# Patient Record
Sex: FEMALE | Race: WHITE | Employment: OTHER | URBAN - METROPOLITAN AREA
[De-identification: names, ages, dates, MRNs, and addresses within clinical notes are randomized per-mention and may not be internally consistent; named-entity substitution may affect disease eponyms.]

---

## 2017-02-01 ENCOUNTER — TELEPHONE (OUTPATIENT)
Dept: FAMILY MEDICINE | Facility: OTHER | Age: 62
End: 2017-02-01

## 2017-02-01 NOTE — TELEPHONE ENCOUNTER
Reason for call:  Results  Reason for Call:  Request for results:    Name of test or procedure: lab    Date of test of procedure: 12-20-16    Location of the test or procedure: [facility] to leave the result message on voice mail or with a family member? YES    Phone number Patient can be reached at:  Home number on file [phone] (home) [phone]    Additional comments: will need results mailed to home.    Call taken on 2/1/2017 at 10:31 AM by Penelope Gomez

## 2017-12-06 NOTE — PROGRESS NOTES
SUBJECTIVE:   CC: Enedelia Abraham is an 62 year old woman who presents for preventive health visit.     Physical   Annual:     Getting at least 3 servings of Calcium per day::  Yes    Bi-annual eye exam::  Yes    Dental care twice a year::  Yes    Sleep apnea or symptoms of sleep apnea::  None    Diet::  Regular (no restrictions)    Frequency of exercise::  4-5 days/week    Duration of exercise::  30-45 minutes    Taking medications regularly::  Yes    Medication side effects::  None    Additional concerns today::  No      Today's PHQ-2 Score:   PHQ-2 ( 1999 Pfizer) 12/11/2017   Q1: Little interest or pleasure in doing things 0   Q2: Feeling down, depressed or hopeless 0   PHQ-2 Score 0   Q1: Little interest or pleasure in doing things Not at all   Q2: Feeling down, depressed or hopeless Not at all   PHQ-2 Score 0       Abuse: Current or Past(Physical, Sexual or Emotional)- No  Do you feel safe in your environment - Yes    Social History   Substance Use Topics     Smoking status: Former Smoker     Packs/day: 0.00     Years: 20.00     Types: Cigarettes     Quit date: 1/1/1998     Smokeless tobacco: Never Used      Comment:  X 20 years/no smokers in household     Alcohol use Yes      Comment: rare- <1 drink Q 6 months     The patient does not drink >3 drinks per day nor >7 drinks per week.    Reviewed orders with patient.  Reviewed health maintenance and updated orders accordingly - Yes  BP Readings from Last 3 Encounters:   12/11/17 112/60   12/20/16 122/64   01/12/16 120/80    Wt Readings from Last 3 Encounters:   12/11/17 127 lb 9.6 oz (57.9 kg)   12/20/16 135 lb (61.2 kg)   01/12/16 131 lb (59.4 kg)                  Current Outpatient Prescriptions   Medication Sig Dispense Refill     traMADol (ULTRAM) 50 MG tablet Take 1-2 tablets ( mg) by mouth 2 times daily as needed for pain 120 tablet 5     pramipexole (MIRAPEX) 0.125 MG tablet Take 1-2 tablets (0.125-0.25 mg) by mouth At Bedtime 180 tablet 3      Ascorbic Acid (VITAMIN C PO) Take 1,000 mg by mouth       CINNAMON PO        COCONUT OIL PO        Chondroitin Sulfate A (CHONDROITIN SULFATE PO)        Omega-3 Fatty Acids (FISH OIL PO) Take  by mouth. 3 per day       ASPIRIN 81 MG OR TABS ONE DAILY 100 3     FLAX SEED OIL 1000 MG OR CAPS and 700mg omeg 3/one twice daily  0     VITAMIN D 1000 UNIT OR CAPS 1 CAPSULE DAILY 30 0     COQ-10 100 MG OR CAPS one daily  0     MAGNESIUM 250 MG OR TABS +glycinate 4 tabs at h.s.  0     MULTI-DAY VITAMINS OR 1 tablet daily         Patient over age 50, mutual decision to screen reflected in health maintenance.      Pertinent mammograms are reviewed under the imaging tab.  History of abnormal Pap smear:   Last 3 Pap Results:   PAP (no units)   Date Value   2015 NIL   12/10/2012 ASC-US (A)   2009 NIL       Reviewed and updated as needed this visit by clinical staffTobacco  Allergies  Meds  Med Hx  Surg Hx  Fam Hx  Soc Hx        Reviewed and updated as needed this visit by Provider        Past Medical History:   Diagnosis Date     Backache, unspecified     fracture of L5     Excessive or frequent menstruation     Menorrhagia 1997 and 3/1997     Pilonidal cyst without mention of abscess     Surgery to remove     Unspecified multiple gestation, delivered     , Vag      Past Surgical History:   Procedure Laterality Date     C LUMBAR SPINE FUSION,ANTER APPRCH  3/01    posterio of L5, S1     COLONOSCOPY N/A 7/15/2015    Procedure: COLONOSCOPY;  Surgeon: Frederick Flores MD;  Location:  GI     DRAIN PILONIDAL CYST SIMPL      Pilonidal cyst surgery     ESOPHAGOSCOPY, GASTROSCOPY, DUODENOSCOPY (EGD), COMBINED  2013    Procedure: COMBINED ESOPHAGOSCOPY, GASTROSCOPY, DUODENOSCOPY (EGD), BIOPSY SINGLE OR MULTIPLE;  ESOPHAGOSCOPY, GASTROSCOPY, DUODENOSCOPY (EGD) with Multiple Biopsy's;  Surgeon: Frederick Dixon MD;  Location:  GI     HC COLONOSCOPY W BIOPSY  05       "DILATION/CURETTAGE DIAG/THER NON OB      D & C       Review of Systems   Constitutional: Negative for chills.   HENT: Negative for congestion and ear pain.    Eyes: Negative for pain.   Respiratory: Negative for cough.    Cardiovascular: Negative for chest pain.   Gastrointestinal: Negative for abdominal pain, constipation, diarrhea and hematochezia.   Genitourinary: Negative for hematuria.   Neurological: Negative for dizziness.     Bone therapy in October 12th, not had to take the Mirapex.   Tramadol only in the Summer when gardening.   Decreased the tramadol      OBJECTIVE:   /60  Pulse 76  Temp 98.9  F (37.2  C) (Temporal)  Resp 14  Ht 5' 1\" (1.549 m)  Wt 127 lb 9.6 oz (57.9 kg)  LMP 04/01/2006  SpO2 98%  BMI 24.11 kg/m2  Physical Exam   Constitutional: She is oriented to person, place, and time. She appears well-developed and well-nourished. No distress.   HENT:   Right Ear: External ear normal.   Left Ear: External ear normal.   Nose: Nose normal.   Mouth/Throat: Oropharynx is clear and moist. No oropharyngeal exudate.   Eyes: Conjunctivae are normal. Pupils are equal, round, and reactive to light. Right eye exhibits no discharge. Left eye exhibits no discharge.   Neck: Neck supple. No tracheal deviation present. No thyromegaly present.   Cardiovascular: Normal rate, regular rhythm, S1 normal, S2 normal, normal heart sounds and normal pulses.  Exam reveals no S3, no S4 and no friction rub.    No murmur heard.  Pulmonary/Chest: Effort normal and breath sounds normal. No respiratory distress. She has no wheezes. She has no rales.   Abdominal: Soft. Bowel sounds are normal. She exhibits no mass. There is no hepatosplenomegaly. There is no tenderness.   Genitourinary: No breast swelling, tenderness or discharge.   Musculoskeletal: Normal range of motion. She exhibits no edema.   Lymphadenopathy:     She has no cervical adenopathy.   Neurological: She is alert and oriented to person, place, and time. " "She has normal strength and normal reflexes. She exhibits normal muscle tone.   Skin: Skin is warm and dry. No rash noted.   Psychiatric: She has a normal mood and affect. Judgment and thought content normal. Cognition and memory are normal.         ASSESSMENT/PLAN:   1. Encounter for routine adult health examination without abnormal findings      2. Restless leg syndrome  - Stable     3. Need for prophylactic vaccination and inoculation against influenza    - Vaccine Administration, Initial [36816]  - FLU VAC, SPLIT VIRUS IM > 3 YO (QUADRIVALENT) [26554]    4. Screening for hyperlipidemia  - Screening completed today.   - Lipid Profile (Chol, Trig, HDL, LDL calc)    5. Screening for diabetes mellitus  - Pending   - Glucose    COUNSELING:  Reviewed preventive health counseling, as reflected in patient instructions       Regular exercise       Healthy diet/nutrition       Immunizations    Vaccinated for: Influenza           reports that she quit smoking about 19 years ago. Her smoking use included Cigarettes. She smoked 0.00 packs per day for 20.00 years. She has never used smokeless tobacco.    Estimated body mass index is 24.11 kg/(m^2) as calculated from the following:    Height as of this encounter: 5' 1\" (1.549 m).    Weight as of this encounter: 127 lb 9.6 oz (57.9 kg).         Counseling Resources:  ATP IV Guidelines  Pooled Cohorts Equation Calculator  Breast Cancer Risk Calculator  FRAX Risk Assessment  ICSI Preventive Guidelines  Dietary Guidelines for Americans, 2010  USDA's MyPlate  ASA Prophylaxis  Lung CA Screening    BARRERA Luna [facility] BERNA Regalado for HPI/ROS submitted by the patient on 12/11/2017   PHQ-2 Score: 0  LAYLA 7 TOTAL SCORE: 0  If you checked off any problems, how difficult have these problems made it for you to do your work, take care of things at home, or get along with other people?: Not difficult at all  PHQ9 TOTAL SCORE: 1    "

## 2017-12-11 ENCOUNTER — OFFICE VISIT (OUTPATIENT)
Dept: FAMILY MEDICINE | Facility: OTHER | Age: 62
End: 2017-12-11
Payer: COMMERCIAL

## 2017-12-11 VITALS
RESPIRATION RATE: 14 BRPM | BODY MASS INDEX: 24.09 KG/M2 | OXYGEN SATURATION: 98 % | HEIGHT: 61 IN | HEART RATE: 76 BPM | SYSTOLIC BLOOD PRESSURE: 112 MMHG | WEIGHT: 127.6 LBS | TEMPERATURE: 98.9 F | DIASTOLIC BLOOD PRESSURE: 60 MMHG

## 2017-12-11 DIAGNOSIS — Z23 NEED FOR PROPHYLACTIC VACCINATION AND INOCULATION AGAINST INFLUENZA: ICD-10-CM

## 2017-12-11 DIAGNOSIS — Z13.1 SCREENING FOR DIABETES MELLITUS: ICD-10-CM

## 2017-12-11 DIAGNOSIS — Z00.00 ENCOUNTER FOR ROUTINE ADULT HEALTH EXAMINATION WITHOUT ABNORMAL FINDINGS: Primary | ICD-10-CM

## 2017-12-11 DIAGNOSIS — G25.81 RESTLESS LEG SYNDROME: ICD-10-CM

## 2017-12-11 DIAGNOSIS — Z13.220 SCREENING FOR HYPERLIPIDEMIA: ICD-10-CM

## 2017-12-11 LAB
CHOLEST SERPL-MCNC: 209 MG/DL
GLUCOSE SERPL-MCNC: 93 MG/DL (ref 70–99)
HDLC SERPL-MCNC: 87 MG/DL
LDLC SERPL CALC-MCNC: 94 MG/DL
NONHDLC SERPL-MCNC: 122 MG/DL
TRIGL SERPL-MCNC: 140 MG/DL

## 2017-12-11 PROCEDURE — 90686 IIV4 VACC NO PRSV 0.5 ML IM: CPT | Performed by: NURSE PRACTITIONER

## 2017-12-11 PROCEDURE — 90471 IMMUNIZATION ADMIN: CPT | Performed by: NURSE PRACTITIONER

## 2017-12-11 PROCEDURE — 99396 PREV VISIT EST AGE 40-64: CPT | Mod: 25 | Performed by: NURSE PRACTITIONER

## 2017-12-11 PROCEDURE — 36415 COLL VENOUS BLD VENIPUNCTURE: CPT | Performed by: NURSE PRACTITIONER

## 2017-12-11 PROCEDURE — 80061 LIPID PANEL: CPT | Performed by: NURSE PRACTITIONER

## 2017-12-11 PROCEDURE — 82947 ASSAY GLUCOSE BLOOD QUANT: CPT | Performed by: NURSE PRACTITIONER

## 2017-12-11 ASSESSMENT — ANXIETY QUESTIONNAIRES
1. FEELING NERVOUS, ANXIOUS, OR ON EDGE: NOT AT ALL
GAD7 TOTAL SCORE: 0
7. FEELING AFRAID AS IF SOMETHING AWFUL MIGHT HAPPEN: NOT AT ALL
5. BEING SO RESTLESS THAT IT IS HARD TO SIT STILL: NOT AT ALL
GAD7 TOTAL SCORE: 0
6. BECOMING EASILY ANNOYED OR IRRITABLE: NOT AT ALL
2. NOT BEING ABLE TO STOP OR CONTROL WORRYING: NOT AT ALL
GAD7 TOTAL SCORE: 0
3. WORRYING TOO MUCH ABOUT DIFFERENT THINGS: NOT AT ALL
7. FEELING AFRAID AS IF SOMETHING AWFUL MIGHT HAPPEN: NOT AT ALL
4. TROUBLE RELAXING: NOT AT ALL

## 2017-12-11 ASSESSMENT — ENCOUNTER SYMPTOMS
EYE PAIN: 0
ABDOMINAL PAIN: 0
COUGH: 0
HEMATURIA: 0
DIARRHEA: 0
HEMATOCHEZIA: 0
DIZZINESS: 0
CONSTIPATION: 0
CHILLS: 0

## 2017-12-11 ASSESSMENT — PAIN SCALES - GENERAL: PAINLEVEL: NO PAIN (0)

## 2017-12-11 ASSESSMENT — PATIENT HEALTH QUESTIONNAIRE - PHQ9
SUM OF ALL RESPONSES TO PHQ QUESTIONS 1-9: 1
10. IF YOU CHECKED OFF ANY PROBLEMS, HOW DIFFICULT HAVE THESE PROBLEMS MADE IT FOR YOU TO DO YOUR WORK, TAKE CARE OF THINGS AT HOME, OR GET ALONG WITH OTHER PEOPLE: NOT DIFFICULT AT ALL
SUM OF ALL RESPONSES TO PHQ QUESTIONS 1-9: 1

## 2017-12-11 NOTE — PROGRESS NOTES
Injectable Influenza Immunization Documentation    1.  Is the person to be vaccinated sick today?   No    2. Does the person to be vaccinated have an allergy to a component   of the vaccine?   No  Egg Allergy Algorithm Link    3. Has the person to be vaccinated ever had a serious reaction   to influenza vaccine in the past?   No    4. Has the person to be vaccinated ever had Guillain-Barré syndrome?   No    Form completed by Pilar Telles CMA    Prior to injection verified patient identity using patient's name and date of birth.  Patient instructed to remain in clinic for 15 minutes afterwards, and to report any adverse reaction to me immediately.    Pilar Telles CMA

## 2017-12-11 NOTE — MR AVS SNAPSHOT
After Visit Summary   12/11/2017    Enedelia Abraham    MRN: [id number]           Patient Information     Date Of Birth          1955        Visit Information        Provider Department      12/11/2017 11:20 AM Grace Villegas APRN [facility]        Today's Diagnoses     Encounter for routine adult health examination without abnormal findings    -  1    Restless leg syndrome        Need for prophylactic vaccination and inoculation against influenza        Screening for hyperlipidemia        Screening for diabetes mellitus          Care Instructions      Preventive Health Recommendations  Female Ages 50 - 64    Yearly exam: See your health care provider every year in order to  o Review health changes.   o Discuss preventive care.    o Review your medicines if your doctor has prescribed any.      Get a Pap test every three years (unless you have an abnormal result and your provider advises testing more often).    If you get Pap tests with HPV test, you only need to test every 5 years, unless you have an abnormal result.     You do not need a Pap test if your uterus was removed (hysterectomy) and you have not had cancer.    You should be tested each year for STDs (sexually transmitted diseases) if you're at risk.     Have a mammogram every 1 to 2 years.    Have a colonoscopy at age 50, or have a yearly FIT test (stool test). These exams screen for colon cancer.      Have a cholesterol test every 5 years, or more often if advised.    Have a diabetes test (fasting glucose) every three years. If you are at risk for diabetes, you should have this test more often.     If you are at risk for osteoporosis (brittle bone disease), think about having a bone density scan (DEXA).    Shots: Get a flu shot each year. Get a tetanus shot every 10 years.    Nutrition:     Eat at least 5 servings of fruits and vegetables each day.    Eat whole-grain bread, whole-wheat pasta and brown rice instead  of white grains and rice.    Talk to your provider about Calcium and Vitamin D.     Lifestyle    Exercise at least 150 minutes a week (30 minutes a day, 5 days a week). This will help you control your weight and prevent disease.    Limit alcohol to one drink per day.    No smoking.     Wear sunscreen to prevent skin cancer.     See your dentist every six months for an exam and cleaning.    See your eye doctor every 1 to 2 years.            Follow-ups after your visit        Your next 10 appointments already scheduled     Dec 20, 2017 10:45 AM CST   (Arrive by 10:30 AM)   MA SCREENING DIGITAL BILATERAL with [facility]   [facility]    [address] [facility] [id number] [phone]           Do not use any powder, lotion or deodorant under your arms or on your breast. If you do, we will ask you to remove it before your exam.  Wear comfortable, two-piece clothing.  If you have any allergies, tell your care team.  Bring any previous mammograms from other facilities or have them mailed to the breast center.              Who to contact     If you have questions or need follow up information about today's clinic visit or your schedule please contact [facility] directly at [phone].  Normal or non-critical lab and imaging results will be communicated to you by [employer], letter or phone within 4 business days after the clinic has received the results. If you do not hear from us within 7 days, please contact the clinic through [employer] or phone. If you have a critical or abnormal lab result, we will notify you by phone as soon as possible.  Submit refill requests through [employer] or call your pharmacy and they will forward the refill request to us. Please allow 3 business days for your refill to be completed.          Additional Information About Your Visit        [employer] Information     [employer] lets you send messages to your doctor, view your test results,  "renew your prescriptions, schedule appointments and more. To sign up, go to [email or web address] . Click on \"Log in\" on the left side of the screen, which will take you to the Welcome page. Then click on \"Sign up Now\" on the right side of the page.     You will be asked to enter the access code listed below, as well as some personal information. Please follow the directions to create your username and password.     Your access code is: [email or web address]  Expires: 3/11/2018 12:25 PM     Your access code will  in 90 days. If you need help or a new code, please call your [place] clinic or [phone].        Care EveryWhere ID     This is your Care EveryWhere ID. This could be used by other organizations to access your [place] medical records  [id number]        Your Vitals Were     Pulse Temperature Respirations Height Last Period Pulse Oximetry    76 98.9  F (37.2  C) (Temporal) 14 5' 1\" (1.549 m) 2006 98%    BMI (Body Mass Index)                   24.11 kg/m2            Blood Pressure from Last 3 Encounters:   17 112/60   16 122/64   16 120/80    Weight from Last 3 Encounters:   17 127 lb 9.6 oz (57.9 kg)   16 135 lb (61.2 kg)   16 131 lb (59.4 kg)              We Performed the Following     Glucose     Lipid Profile (Chol, Trig, HDL, LDL calc)        Primary Care Provider Office Phone # Fax #    Dayan Bateman [phone]       [address]  [facility] [address]        Equal Access to Services     [facility] REGINALD : Hadii molly Nam, juwan bhat, anita hornalmasanchez arevalo, daniele francois. So [facility] [phone].    ATENCIÓN: Si habla español, tiene a salter disposición servicios gratuitos de asistencia lingüística. Llame al [phone].    We comply with applicable federal civil rights laws and [place] laws. We do not discriminate on the basis of race, color, national origin, age, disability, sex, " sexual orientation, or gender identity.            Thank you!     Thank you for choosing [facility]  for your care. Our goal is always to provide you with excellent care. Hearing back from our patients is one way we can continue to improve our services. Please take a few minutes to complete the written survey that you may receive in the mail after your visit with us. Thank you!             Your Updated Medication List - Protect others around you: Learn how to safely use, store and throw away your medicines at www.disposemymeds.org.          This list is accurate as of: 12/11/17 12:25 PM.  Always use your most recent med list.                   Brand Name Dispense Instructions for use Diagnosis    aspirin 81 MG tablet     100    ONE DAILY        CHONDROITIN SULFATE PO           CINNAMON PO           COCONUT OIL PO           CoQ-10 100 MG Caps      one daily        FISH OIL PO      Take  by mouth. 3 per day        flax seed oil 1000 MG capsule      and 700mg omeg 3/one twice daily        magnesium 250 MG tablet      +glycinate 4 tabs at h.s.        MULTI-DAY VITAMINS PO      1 tablet daily        pramipexole 0.125 MG tablet    MIRAPEX    180 tablet    Take 1-2 tablets (0.125-0.25 mg) by mouth At Bedtime    Restless leg syndrome       traMADol 50 MG tablet    ULTRAM    120 tablet    Take 1-2 tablets ( mg) by mouth 2 times daily as needed for pain    Bilateral low back pain without sciatica, unspecified chronicity, Restless leg syndrome       VITAMIN C PO      Take 1,000 mg by mouth        vitamin D 1000 UNITS capsule     30    1 CAPSULE DAILY

## 2017-12-11 NOTE — NURSING NOTE
"Chief Complaint   Patient presents with     Physical     Panel Management     mychart, height, fl, honoring choices, urine drug screen, phq9/[id number]       Initial /60  Pulse 76  Temp 98.9  F (37.2  C) (Temporal)  Resp 14  Ht 5' 1\" (1.549 m)  Wt 127 lb 9.6 oz (57.9 kg)  LMP 04/01/2006  SpO2 98%  BMI 24.11 kg/m2 Estimated body mass index is 24.11 kg/(m^2) as calculated from the following:    Height as of this encounter: 5' 1\" (1.549 m).    Weight as of this encounter: 127 lb 9.6 oz (57.9 kg).  Medication Reconciliation: complete  "

## 2017-12-12 ENCOUNTER — TELEPHONE (OUTPATIENT)
Dept: FAMILY MEDICINE | Facility: OTHER | Age: 62
End: 2017-12-12

## 2017-12-12 ASSESSMENT — PATIENT HEALTH QUESTIONNAIRE - PHQ9: SUM OF ALL RESPONSES TO PHQ QUESTIONS 1-9: 1

## 2017-12-12 ASSESSMENT — ANXIETY QUESTIONNAIRES: GAD7 TOTAL SCORE: 0

## 2017-12-12 NOTE — TELEPHONE ENCOUNTER
LM for patient regarding message below.  Kiara Cardoso CMA (AAMA)    Notes Recorded by Grace Villegas APRN CNP on 12/12/2017 at 7:57 AM  Please let patient know that her glucose was normal along with her cholesterol looks great. Her Total cholesterol is a little elevated, but her good and bad cholesterol numbers are stable. Continue with healthy lifestyle and recheck in 1 year.     BARRERA Luna CNP    Results for orders placed or performed in visit on 12/11/17   Lipid Profile (Chol, Trig, HDL, LDL calc)   Result Value Ref Range    Cholesterol 209 (H) <200 mg/dL    Triglycerides 140 <150 mg/dL    HDL Cholesterol 87 >49 mg/dL    LDL Cholesterol Calculated 94 <100 mg/dL    Non HDL Cholesterol 122 <130 mg/dL   Glucose   Result Value Ref Range    Glucose 93 70 - 99 mg/dL

## 2017-12-20 ENCOUNTER — RADIANT APPOINTMENT (OUTPATIENT)
Dept: MAMMOGRAPHY | Facility: OTHER | Age: 62
End: 2017-12-20
Payer: COMMERCIAL

## 2017-12-20 DIAGNOSIS — Z12.31 VISIT FOR SCREENING MAMMOGRAM: ICD-10-CM

## 2017-12-20 PROCEDURE — G0202 SCR MAMMO BI INCL CAD: HCPCS | Mod: TC

## 2018-04-18 ENCOUNTER — TRANSFERRED RECORDS (OUTPATIENT)
Dept: HEALTH INFORMATION MANAGEMENT | Facility: CLINIC | Age: 63
End: 2018-04-18

## 2018-07-05 DIAGNOSIS — M54.50 BILATERAL LOW BACK PAIN WITHOUT SCIATICA, UNSPECIFIED CHRONICITY: ICD-10-CM

## 2018-07-05 DIAGNOSIS — G25.81 RESTLESS LEG SYNDROME: ICD-10-CM

## 2018-07-06 RX ORDER — TRAMADOL HYDROCHLORIDE 50 MG/1
50-100 TABLET ORAL 2 TIMES DAILY PRN
Qty: 120 TABLET | Refills: 5 | OUTPATIENT
Start: 2018-07-06

## 2018-07-06 NOTE — TELEPHONE ENCOUNTER
This hasn't been addressed since 12/2016, it is a controlled substance, she will need an appointment.

## 2018-07-06 NOTE — TELEPHONE ENCOUNTER
Requested Prescriptions   Pending Prescriptions Disp Refills     traMADol (ULTRAM) 50 MG tablet 120 tablet 5     Sig: Take 1-2 tablets ( mg) by mouth 2 times daily as needed for pain    There is no refill protocol information for this order        traMADol (ULTRAM) 50 MG tablet      Last Written Prescription Date:  12/20/2016  Last Fill Quantity: 120,   # refills: 5  Last Office Visit: 12/11/2017  Future Office visit:       Routing refill request to provider for review/approval because:  Drug not on the [facility] or [facility] refill protocol or controlled substance  Last prescribed 12/2016    Last saw KV, who is not in clinic today. Routing to PCP    Kim Roca, RN, BSN

## 2018-09-17 NOTE — PROGRESS NOTES
[address]  [facility] [address]  [phone]  Dept: [phone]    PRE-OP EVALUATION:  Today's date: 2018    Enedelia Abraham (: 1955) presents for pre-operative evaluation assessment as requested by Dr. Fajardo.  She requires evaluation and anesthesia risk assessment prior to undergoing surgery/procedure for treatment of Bilateral Cataracts.    Fax number for surgical facility: [phone]  Primary Physician: Dayan Bateman  Type of Anesthesia Anticipated: IV sedative    Patient has a Health Care Directive or Living Will:  YES     Preop Questions 2018   Who is doing your surgery? Dr. Fajardo   What are you having done? Cataract Surgery    Date of Surgery/Procedure: 10-   Facility or Hospital where procedure/surgery will be performed: [facility]   1.  Do you have a history of Heart attack, stroke, stent, coronary bypass surgery, or other heart surgery? No   2.  Do you ever have any pain or discomfort in your chest? No   3.  Do you have a history of  Heart Failure? No   4.   Are you troubled by shortness of breath when:  walking on a level surface, or up a slight hill, or at night? No   5.  Do you currently have a cold, bronchitis or other respiratory infection? No   6.  Do you have a cough, shortness of breath, or wheezing? No   7.  Do you sometimes get pains in the calves of your legs when you walk? No   8. Do you or anyone in your family have previous history of blood clots? No   9.  Do you or does anyone in your family have a serious bleeding problem such as prolonged bleeding following surgeries or cuts? No   10. Have you ever had problems with anemia or been told to take iron pills? No   11. Have you had any abnormal blood loss such as black, tarry or bloody stools, or abnormal vaginal bleeding? No   12. Have you ever had a blood transfusion? No   13. Have you or any of your relatives ever had problems with anesthesia?  No   14. Do you have sleep apnea, excessive snoring or daytime drowsiness? No   15. Do you have any prosthetic heart valves? No   16. Do you have prosthetic joints? No   17. Is there any chance that you may be pregnant? No         HPI:     HPI related to upcoming procedure: She is being seen by Nancy Braxton OD at [facility]. Per her last ophthalmology note, in the last year she has noticed increased difficulty reading, sun blinding her, difficulty with nighttime driving. She often needs to use a magnifying glass to read anything that is up close. She avoids nighttime driving. She will be having surgery on both cataracts, one on October 8th, and one later in the month, not sure of the date.       See problem list for active medical problems.  Problems all longstanding and stable, except as noted/documented.  See ROS for pertinent symptoms related to these conditions.                                                                                                                                                          .  MEDICAL HISTORY:     Patient Active Problem List    Diagnosis Date Noted     Chronic pain syndrome 12/20/2016     Priority: Medium     Patient is followed by Dayan Bateman MD for ongoing prescription of pain medication.  All refills should be approved by this provider, or covering partner.    Medication(s): tramadol.   Maximum quantity per month: 120  Clinic visit frequency required: Q 6  months     Controlled substance agreement:  Encounter-Level CSA:     There are no encounter-level csa.        Pain Clinic evaluation in the past: No    DIRE Total Score(s):  No flowsheet data found.    Last [facility] website verification:  done on 12/20/16   [email or web address]               Back pain 12/11/2013     Priority: Medium     Restless leg syndrome 12/11/2013     Priority: Medium     ASCUS on Pap smear 12/17/2012     Priority: Medium     12/10/12 pap- ASCUS neg HPV. Plan-- per  updated guidelines, repeat screening pap in 3 years.  12/18/15 pap NIL/ neg HR HPV. Plan routine screening.            Advanced directives, counseling/discussion 12/10/2012     Priority: Medium     Patient states has Advance Directive and will bring in a copy to clinic. 12/10/2012   Advance Care Planning:   Receipt of ACP document:  Received: Health Care Directive which was witnessed or notarized on 2013.  Document not previously scanned.  Validation form completed and sent with document to be scanned.  Code Status reflects choices in most recent ACP document.  Confirmed/documented designated decision maker(s). See permanent comments section of demographics in clinical tab. View document(s) and details by clicking on code status.   Added by CATHY BEEBE on 2013.              Past Medical History:   Diagnosis Date     Backache, unspecified     fracture of L5     Excessive or frequent menstruation     Menorrhagia 1997 and 3/1997     Pilonidal cyst without mention of abscess     Surgery to remove     Unspecified multiple gestation, delivered     , Vag     Past Surgical History:   Procedure Laterality Date     C LUMBAR SPINE FUSION,ANTER APPRCH  3/01    posterio of L5, S1     COLONOSCOPY N/A 7/15/2015    Procedure: COLONOSCOPY;  Surgeon: Frederick Flores MD;  Location: [facility]     DRAIN PILONIDAL CYST SIMPL      Pilonidal cyst surgery     ESOPHAGOSCOPY, GASTROSCOPY, DUODENOSCOPY (EGD), COMBINED  2013    Procedure: COMBINED ESOPHAGOSCOPY, GASTROSCOPY, DUODENOSCOPY (EGD), BIOPSY SINGLE OR MULTIPLE;  ESOPHAGOSCOPY, GASTROSCOPY, DUODENOSCOPY (EGD) with Multiple Biopsy's;  Surgeon: Frederick Dixon MD;  Location:  GI     HC COLONOSCOPY W BIOPSY  05     HC DILATION/CURETTAGE DIAG/THER NON OB      D & C     Current Outpatient Prescriptions   Medication Sig Dispense Refill     Ascorbic Acid (VITAMIN C PO) Take 1,000 mg by mouth       ASPIRIN 81 MG OR TABS ONE DAILY 100 3      Chondroitin Sulfate A (CHONDROITIN SULFATE PO)        CINNAMON PO        COCONUT OIL PO        COQ-10 100 MG OR CAPS one daily  0     FLAX SEED OIL 1000 MG OR CAPS and 700mg omeg 3/one twice daily  0     MAGNESIUM 250 MG OR TABS +glycinate 4 tabs at h.s.  0     MULTI-DAY VITAMINS OR 1 tablet daily       Omega-3 Fatty Acids (FISH OIL PO) Take  by mouth. 3 per day       pramipexole (MIRAPEX) 0.125 MG tablet Take 1-2 tablets (0.125-0.25 mg) by mouth At Bedtime 180 tablet 3     traMADol (ULTRAM) 50 MG tablet Take 1-2 tablets ( mg) by mouth 2 times daily as needed for pain 120 tablet 5     VITAMIN D 1000 UNIT OR CAPS 1 CAPSULE DAILY 30 0     OTC products: herbals and vitamins - See medication list, patient advised not to take; Ibuprofen    Allergies   Allergen Reactions     Bupropion Hcl Hives     Wellbutrin     Clindamycin Diarrhea     Several Loose stools/cramping/rectal pressure      Latex Allergy: NO    Social History   Substance Use Topics     Smoking status: Former Smoker     Packs/day: 0.00     Years: 20.00     Types: Cigarettes     Quit date: 1/1/1998     Smokeless tobacco: Never Used      Comment:  X 20 years/no smokers in household     Alcohol use Yes      Comment: rare- <1 drink Q 6 months     History   Drug Use No       REVIEW OF SYSTEMS:   Constitutional, HEENT, cardiovascular, pulmonary, GI, , musculoskeletal, neuro, skin, endocrine and psych systems are negative, except as in HPI or otherwise noted.     This document serves as a record of the services and decisions personally performed and made by Uma Grimm MD. It was created on her behalf by Sarah Dumont, a trained medical scribe. The creation of this document is based the provider's statements to the medical scribe.  Sarah Dumont, September 26, 2018 9:32 AM     EXAM:   LMP 04/01/2006  GENERAL APPEARANCE: healthy, alert and no distress  HENT: ear canals and TM's normal and nose and mouth without ulcers or lesions  RESP: lungs clear to auscultation -  no rales, rhonchi or wheezes  CV: regular rate and rhythm, normal S1 S2, no S3 or S4 and no murmur, click or rub   ABDOMEN: soft, nontender, no HSM or masses and bowel sounds normal  NEURO: Normal strength and tone, sensory exam grossly normal, mentation intact and speech normal    DIAGNOSTICS:     No labs or EKG required for low risk surgery (cataract, skin procedure, breast biopsy, etc)  Labs Resulted Today:   Results for orders placed or performed in visit on 04/18/18   Eye Exam - HIM Scan    Narrative    [facility]       Recent Labs   Lab Test  12/16/14   1033  09/09/10   1132   HGB  13.7  13.5   PLT  220  301   NA  138   --    POTASSIUM  3.8   --    CR  0.72   --         IMPRESSION:   Reason for surgery/procedure: Bilateral cataracts  Diagnosis/reason for consult: Otherwise healthy 62 year old patient presenting for anesthesia risk evaluation.     The proposed surgical procedure is considered LOW risk.    REVISED CARDIAC RISK INDEX  The patient has the following serious cardiovascular risks for perioperative complications such as (MI, PE, VFib and 3  AV Block):  No serious cardiac risks  INTERPRETATION: 0 risks: Class I (very low risk - 0.4% complication rate)    The patient has the following additional risks for perioperative complications:  No identified additional risks      ICD-10-CM    1. Screening for HIV (human immunodeficiency virus) Z11.4    2. Preop general physical exam Z01.818        RECOMMENDATIONS:     --Patient is to take all scheduled medications on the day of surgery EXCEPT for modifications listed below.    Anticoagulant or Antiplatelet Medication Use  ASPIRIN: Bleeding risk is low for this procedure and aspirin 81 mg daily should be continued in the perioperative period        APPROVAL GIVEN to proceed with proposed procedure, without further diagnostic evaluation     The information in this document, created by the medical scribe for me, accurately reflects the services I  personally performed and the decisions made by me. I have reviewed and approved this document for accuracy.   Uma Grimm MD     Signed Electronically by: Uma Grimm MD, MD    Copy of this evaluation report is provided to requesting physician.    [facility] Preop Guidelines    Revised Cardiac Risk Index

## 2018-09-26 ENCOUNTER — OFFICE VISIT (OUTPATIENT)
Dept: FAMILY MEDICINE | Facility: OTHER | Age: 63
End: 2018-09-26
Payer: COMMERCIAL

## 2018-09-26 VITALS
DIASTOLIC BLOOD PRESSURE: 70 MMHG | TEMPERATURE: 98.5 F | BODY MASS INDEX: 24.92 KG/M2 | WEIGHT: 132 LBS | HEART RATE: 70 BPM | HEIGHT: 61 IN | OXYGEN SATURATION: 99 % | SYSTOLIC BLOOD PRESSURE: 132 MMHG

## 2018-09-26 DIAGNOSIS — Z11.4 SCREENING FOR HIV (HUMAN IMMUNODEFICIENCY VIRUS): ICD-10-CM

## 2018-09-26 DIAGNOSIS — Z01.818 PREOP GENERAL PHYSICAL EXAM: Primary | ICD-10-CM

## 2018-09-26 DIAGNOSIS — Z23 NEED FOR PROPHYLACTIC VACCINATION AND INOCULATION AGAINST INFLUENZA: ICD-10-CM

## 2018-09-26 PROCEDURE — 90682 RIV4 VACC RECOMBINANT DNA IM: CPT | Performed by: FAMILY MEDICINE

## 2018-09-26 PROCEDURE — 90471 IMMUNIZATION ADMIN: CPT | Performed by: FAMILY MEDICINE

## 2018-09-26 PROCEDURE — 99214 OFFICE O/P EST MOD 30 MIN: CPT | Performed by: FAMILY MEDICINE

## 2018-09-26 NOTE — PROGRESS NOTES

## 2018-09-26 NOTE — MR AVS SNAPSHOT
After Visit Summary   9/26/2018    Enedelia Abraham    MRN: [id number]           Patient Information     Date Of Birth          1955        Visit Information        Provider Department      9/26/2018 9:00 AM Uma Grimm MD [facility]        Today's Diagnoses     Preop general physical exam    -  1    Screening for HIV (human immunodeficiency virus)          Care Instructions    Stop: Ibuprofen (tylenol okay for pain), and all supplements one week prior to surgery.     Before Your Surgery      Call your surgeon if there is any change in your health. This includes signs of a cold or flu (such as a sore throat, runny nose, cough, rash or fever).    Do not smoke, drink alcohol or take over the counter medicine (unless your surgeon or primary care doctor tells you to) for the 24 hours before and after surgery.    If you take prescribed drugs: Follow your doctor s orders about which medicines to take and which to stop until after surgery.    Eating and drinking prior to surgery: follow the instructions from your surgeon    Take a shower or bath the night before surgery. Use the soap your surgeon gave you to gently clean your skin. If you do not have soap from your surgeon, use your regular soap. Do not shave or scrub the surgery site.  Wear clean pajamas and have clean sheets on your bed.           Follow-ups after your visit        Who to contact     If you have questions or need follow up information about today's clinic visit or your schedule please contact [facility] directly at [phone].  Normal or non-critical lab and imaging results will be communicated to you by MyChart, letter or phone within 4 business days after the clinic has received the results. If you do not hear from us within 7 days, please contact the clinic through MyChart or phone. If you have a critical or abnormal lab result, we will notify you by phone as soon as possible.  Submit refill  "requests through Reissued or call your pharmacy and they will forward the refill request to us. Please allow 3 business days for your refill to be completed.          Additional Information About Your Visit        Reissued Information     Reissued lets you send messages to your doctor, view your test results, renew your prescriptions, schedule appointments and more. To sign up, go to [email or web address] . Click on \"Log in\" on the left side of the screen, which will take you to the Welcome page. Then click on \"Sign up Now\" on the right side of the page.     You will be asked to enter the access code listed below, as well as some personal information. Please follow the directions to create your username and password.     Your access code is: [email or web address]  Expires: 2018  9:00 AM     Your access code will  in 90 days. If you need help or a new code, please call your [place] clinic or [phone].        Care EveryWhere ID     This is your Care EveryWhere ID. This could be used by other organizations to access your [place] medical records  [id number]        Your Vitals Were     Pulse Temperature Height Last Period Pulse Oximetry Breastfeeding?    70 98.5  F (36.9  C) (Temporal) 5' 1\" (1.549 m) 2006 99% No    BMI (Body Mass Index)                   24.94 kg/m2            Blood Pressure from Last 3 Encounters:   18 132/70   17 112/60   16 122/64    Weight from Last 3 Encounters:   18 132 lb (59.9 kg)   17 127 lb 9.6 oz (57.9 kg)   16 135 lb (61.2 kg)              Today, you had the following     No orders found for display         Today's Medication Changes          These changes are accurate as of 18  9:36 AM.  If you have any questions, ask your nurse or doctor.               Stop taking these medicines if you haven't already. Please contact your care team if you have questions.     pramipexole 0.125 MG tablet   Commonly known as:  MIRAPEX   Stopped by:  " Uma Grimm MD           traMADol 50 MG tablet   Commonly known as:  ULTRAM   Stopped by:  Uma Grimm MD                    Primary Care Provider Office Phone # Fax #    Dayan CHOW MD Bhavana [phone]       [address] [place] [address] [address]  [facility] [address]        Equal Access to Services     [facility]: Hadii aad ku hadasho Soomaali, waaxda luqadaha, qaybta kaalmada adeegyada, waxay idiin hayaan adeeg kharash la'aan . So [facility] [phone].    ATENCIÓN: Si habla español, tiene a salter disposición servicios gratuitos de asistencia lingüística. Llame al [phone].    We comply with applicable federal civil rights laws and [place] laws. We do not discriminate on the basis of race, color, national origin, age, disability, sex, sexual orientation, or gender identity.            Thank you!     Thank you for choosing [facility]  for your care. Our goal is always to provide you with excellent care. Hearing back from our patients is one way we can continue to improve our services. Please take a few minutes to complete the written survey that you may receive in the mail after your visit with us. Thank you!             Your Updated Medication List - Protect others around you: Learn how to safely use, store and throw away your medicines at www.disposemymeds.org.          This list is accurate as of 9/26/18  9:36 AM.  Always use your most recent med list.                   Brand Name Dispense Instructions for use Diagnosis    aspirin 81 MG tablet     100    ONE DAILY        CHONDROITIN SULFATE PO           CINNAMON PO           COCONUT OIL PO           CoQ-10 100 MG Caps      one daily        FISH OIL PO      Take  by mouth. 3 per day        flax seed oil 1000 MG capsule      and 700mg omeg 3/one twice daily        magnesium 250 MG tablet      +glycinate 4 tabs at h.s.        MULTI-DAY VITAMINS PO      1 tablet daily        VITAMIN C PO      Take 1,000 mg by mouth        vitamin D  1000 units capsule     30    1 CAPSULE DAILY

## 2018-10-08 ENCOUNTER — TRANSFERRED RECORDS (OUTPATIENT)
Dept: HEALTH INFORMATION MANAGEMENT | Facility: CLINIC | Age: 63
End: 2018-10-08

## 2018-10-24 ENCOUNTER — TRANSFERRED RECORDS (OUTPATIENT)
Dept: HEALTH INFORMATION MANAGEMENT | Facility: CLINIC | Age: 63
End: 2018-10-24

## 2018-12-14 NOTE — PATIENT INSTRUCTIONS

## 2018-12-14 NOTE — PROGRESS NOTES
"   SUBJECTIVE:   CC: Enedelia Abraham is an 63 year old woman who presents for preventive health visit.     Healthy Habits:    Do you get at least three servings of calcium containing foods daily (dairy, green leafy vegetables, etc.)? { :010381::\"yes\"}    Amount of exercise or daily activities, outside of work: { :309308}    Problems taking medications regularly { :276036::\"No\"}    Medication side effects: { :491656::\"No\"}    Have you had an eye exam in the past two years? { :316594}    Do you see a dentist twice per year? { :870578}    Do you have sleep apnea, excessive snoring or daytime drowsiness?{ :202413}  {Outside tests to abstract? :691985}    {additional problems to add (Optional):287947}    Today's PHQ-2 Score:   PHQ-2 (  [employer]) 2017   Q1: Little interest or pleasure in doing things 0 0   Q2: Feeling down, depressed or hopeless 0 0   PHQ-2 Score 0 0   Q1: Little interest or pleasure in doing things Not at all -   Q2: Feeling down, depressed or hopeless Not at all -   PHQ-2 Score 0 -     {PHQ-2 LOOK IN ASSESSMENTS (Optional) :541839}  Abuse: Current or Past(Physical, Sexual or Emotional)- {YES/NO/NA:350426}  Do you feel safe in your environment? {YES/NO/NA:588099}    Social History     Tobacco Use     Smoking status: Former Smoker     Packs/day: 0.00     Years: 20.00     Pack years: 0.00     Types: Cigarettes     Last attempt to quit: 1998     Years since quittin.9     Smokeless tobacco: Never Used     Tobacco comment:  X 20 years/no smokers in household   Substance Use Topics     Alcohol use: Yes     Comment: rare- <1 drink Q 6 months     If you drink alcohol do you typically have >3 drinks per day or >7 drinks per week? {ETOH :096040}                     Reviewed orders with patient.  Reviewed health maintenance and updated orders accordingly - {Yes/No:090821::\"Yes\"}  {Chronicprobdata (Optional):374220}    {Mammo Decision Support (Optional):657317}    Pertinent mammograms are " "reviewed under the imaging tab.  History of abnormal Pap smear: {PAP HX:962105}  PAP / HPV Latest Ref Rng & Units 12/18/2015 12/10/2012 9/4/2009   PAP - NIL ASC-US(A) NIL   HPV 16 DNA NEG Negative - -   HPV 18 DNA NEG Negative - -   OTHER HR HPV NEG Negative - -     Reviewed and updated as needed this visit by clinical staff         Reviewed and updated as needed this visit by Provider        {HISTORY OPTIONS (Optional):653011}    ROS:  { :597473}    OBJECTIVE:   LMP 04/01/2006   EXAM:  {Exam Choices:596600}    {Diagnostic Test Results (Optional):139559::\"Diagnostic Test Results:\",\"none \"}    ASSESSMENT/PLAN:   {Diag Picklist:483695}    COUNSELING:   {FEMALE COUNSELING MESSAGES:518137::\"Reviewed preventive health counseling, as reflected in patient instructions\"}    BP Readings from Last 1 Encounters:   09/26/18 132/70     Estimated body mass index is 24.94 kg/m  as calculated from the following:    Height as of 9/26/18: 1.549 m (5' 1\").    Weight as of 9/26/18: 59.9 kg (132 lb).    {BP Counseling- Complete if BP >= 120/80  (Optional):398818}  {Weight Management Plan (ACO) Complete if BMI is abnormal-  Ages 18-64  BMI >24.9.  Age 65+ with BMI <23 or >30 (Optional):497848}     reports that she quit smoking about 20 years ago. Her smoking use included cigarettes. She smoked 0.00 packs per day for 20.00 years. she has never used smokeless tobacco.  {Tobacco Cessation -- Complete if patient is a smoker (Optional):833443}    Counseling Resources:  ATP IV Guidelines  Pooled Cohorts Equation Calculator  Breast Cancer Risk Calculator  FRAX Risk Assessment  ICSI Preventive Guidelines  Dietary Guidelines for Americans, 2010  USDA's MyPlate  ASA Prophylaxis  Lung CA Screening    Dayan Bateman MD  [facility]  "

## 2018-12-19 ENCOUNTER — ANCILLARY PROCEDURE (OUTPATIENT)
Dept: MAMMOGRAPHY | Facility: OTHER | Age: 63
End: 2018-12-19
Payer: COMMERCIAL

## 2018-12-19 DIAGNOSIS — Z12.31 VISIT FOR SCREENING MAMMOGRAM: ICD-10-CM

## 2018-12-19 PROCEDURE — 77067 SCR MAMMO BI INCL CAD: CPT | Mod: TC

## 2018-12-21 ENCOUNTER — OFFICE VISIT (OUTPATIENT)
Dept: FAMILY MEDICINE | Facility: OTHER | Age: 63
End: 2018-12-21
Payer: COMMERCIAL

## 2018-12-21 VITALS
TEMPERATURE: 98.5 F | HEIGHT: 61 IN | WEIGHT: 129.6 LBS | RESPIRATION RATE: 16 BRPM | SYSTOLIC BLOOD PRESSURE: 128 MMHG | OXYGEN SATURATION: 100 % | BODY MASS INDEX: 24.47 KG/M2 | DIASTOLIC BLOOD PRESSURE: 82 MMHG | HEART RATE: 90 BPM

## 2018-12-21 DIAGNOSIS — N95.2 ATROPHIC VAGINITIS: ICD-10-CM

## 2018-12-21 DIAGNOSIS — Z12.4 SCREENING FOR MALIGNANT NEOPLASM OF CERVIX: ICD-10-CM

## 2018-12-21 DIAGNOSIS — Z00.00 ENCOUNTER FOR ROUTINE ADULT HEALTH EXAMINATION WITHOUT ABNORMAL FINDINGS: Primary | ICD-10-CM

## 2018-12-21 PROCEDURE — 99396 PREV VISIT EST AGE 40-64: CPT | Performed by: FAMILY MEDICINE

## 2018-12-21 PROCEDURE — 87624 HPV HI-RISK TYP POOLED RSLT: CPT | Performed by: FAMILY MEDICINE

## 2018-12-21 PROCEDURE — G0145 SCR C/V CYTO,THINLAYER,RESCR: HCPCS | Performed by: FAMILY MEDICINE

## 2018-12-21 RX ORDER — ESTRADIOL 0.1 MG/G
2 CREAM VAGINAL
Qty: 48 G | Refills: 3 | Status: SHIPPED | OUTPATIENT
Start: 2018-12-24 | End: 2020-01-14

## 2018-12-21 ASSESSMENT — ENCOUNTER SYMPTOMS
FEVER: 0
ABDOMINAL PAIN: 0
HEMATURIA: 0
DIARRHEA: 0
CONSTIPATION: 0
DIZZINESS: 0
EYE PAIN: 0
NERVOUS/ANXIOUS: 0
HEMATOCHEZIA: 0
COUGH: 0
CHILLS: 0

## 2018-12-21 ASSESSMENT — ANXIETY QUESTIONNAIRES
GAD7 TOTAL SCORE: 0
2. NOT BEING ABLE TO STOP OR CONTROL WORRYING: NOT AT ALL
GAD7 TOTAL SCORE: 0
5. BEING SO RESTLESS THAT IT IS HARD TO SIT STILL: NOT AT ALL
3. WORRYING TOO MUCH ABOUT DIFFERENT THINGS: NOT AT ALL
1. FEELING NERVOUS, ANXIOUS, OR ON EDGE: NOT AT ALL
GAD7 TOTAL SCORE: 0
7. FEELING AFRAID AS IF SOMETHING AWFUL MIGHT HAPPEN: NOT AT ALL
7. FEELING AFRAID AS IF SOMETHING AWFUL MIGHT HAPPEN: NOT AT ALL
4. TROUBLE RELAXING: NOT AT ALL
6. BECOMING EASILY ANNOYED OR IRRITABLE: NOT AT ALL

## 2018-12-21 ASSESSMENT — MIFFLIN-ST. JEOR: SCORE: 1075.24

## 2018-12-21 ASSESSMENT — PATIENT HEALTH QUESTIONNAIRE - PHQ9
SUM OF ALL RESPONSES TO PHQ QUESTIONS 1-9: 0
10. IF YOU CHECKED OFF ANY PROBLEMS, HOW DIFFICULT HAVE THESE PROBLEMS MADE IT FOR YOU TO DO YOUR WORK, TAKE CARE OF THINGS AT HOME, OR GET ALONG WITH OTHER PEOPLE: NOT DIFFICULT AT ALL
SUM OF ALL RESPONSES TO PHQ QUESTIONS 1-9: 0

## 2018-12-21 NOTE — LETTER
January 2, 2019    Enedelia Abraham  [address] [place] [address]    Dear Enedelia,  We are happy to inform you that your PAP smear result from 12/21/18 is normal.  We are now able to do a follow up test on PAP smears. The DNA test is for HPV (Human Papilloma Virus). Cervical cancer is closely linked with certain types of HPV. Your results showed no evidence of high risk HPV.  Therefore we recommend you return in 5 years for your next pap smear and HPV test.  You will still need to return to the clinic every year for an annual exam and other preventive tests.  If you have additional questions regarding this result, please call our registered nurse, Yolanda at [phone].  Sincerely,    Dayan Bateman MD/reina

## 2018-12-21 NOTE — PROGRESS NOTES
SUBJECTIVE:   CC: Enedelia Abraham is an 63 year old woman who presents for preventive health visit.     Physical   Annual:     Getting at least 3 servings of Calcium per day:  Yes    Bi-annual eye exam:  Yes    Dental care twice a year:  Yes    Sleep apnea or symptoms of sleep apnea:  None    Diet:  Regular (no restrictions)    Frequency of exercise:  4-5 days/week    Duration of exercise:  30-45 minutes    Taking medications regularly:  Yes    Medication side effects:  Not applicable    Additional concerns today:  No    PHQ-2 Total Score: 0    Answers for HPI/ROS submitted by the patient on 2018   Annual Exam:  If you checked off any problems, how difficult have these problems made it for you to do your work, take care of things at home, or get along with other people?: Not difficult at all  PHQ9 TOTAL SCORE: 0  LAYLA 7 TOTAL SCORE: 0    Today's PHQ-2 Score:   PHQ-2 (  [employer]) 2018   Q1: Little interest or pleasure in doing things 0   Q2: Feeling down, depressed or hopeless 0   PHQ-2 Score 0   Q1: Little interest or pleasure in doing things Not at all   Q2: Feeling down, depressed or hopeless Not at all   PHQ-2 Score 0       Abuse: Current or Past(Physical, Sexual or Emotional)- No  Do you feel safe in your environment? Yes    Social History     Tobacco Use     Smoking status: Former Smoker     Packs/day: 0.00     Years: 20.00     Pack years: 0.00     Types: Cigarettes     Last attempt to quit: 1998     Years since quittin.9     Smokeless tobacco: Never Used     Tobacco comment:  X 20 years/no smokers in household   Substance Use Topics     Alcohol use: Yes     Comment: rare- <1 drink Q 6 months     Alcohol Use 2018   If you drink alcohol do you typically have greater than 3 drinks per day OR greater than 7 drinks per week? No       Reviewed orders with patient.  Reviewed health maintenance and updated orders accordingly - Yes    Mammogram Screening: Patient over age 50, mutual decision  "to screen reflected in health maintenance.    Pertinent mammograms are reviewed under the imaging tab.  History of abnormal Pap smear: YES - updated in Problem List and Health Maintenance accordingly  PAP / HPV Latest Ref Rng & Units 12/18/2015 12/10/2012 9/4/2009   PAP - NIL ASC-US(A) NIL   HPV 16 DNA NEG Negative - -   HPV 18 DNA NEG Negative - -   OTHER HR HPV NEG Negative - -     Reviewed and updated as needed this visit by clinical staff  Tobacco  Allergies  Meds  Problems  Med Hx  Surg Hx  Fam Hx  Soc Hx          Reviewed and updated as needed this visit by Provider  Tobacco  Allergies  Meds  Problems  Med Hx  Surg Hx  Fam Hx            Review of Systems   Constitutional: Negative for chills and fever.   HENT: Negative for congestion and ear pain.    Eyes: Negative for pain.   Respiratory: Negative for cough.    Cardiovascular: Negative for chest pain.   Gastrointestinal: Negative for abdominal pain, constipation, diarrhea and hematochezia.   Genitourinary: Negative for hematuria.   Neurological: Negative for dizziness.   Psychiatric/Behavioral: The patient is not nervous/anxious.           OBJECTIVE:   /82 (BP Location: Right arm, Patient Position: Chair, Cuff Size: Adult Regular)   Pulse 90   Temp 98.5  F (36.9  C) (Temporal)   Resp 16   Ht 1.549 m (5' 1\")   Wt 58.8 kg (129 lb 9.6 oz)   LMP 04/01/2006   SpO2 100%   Breastfeeding? No   BMI 24.49 kg/m    Physical Exam   Constitutional: She is oriented to person, place, and time. She appears well-developed and well-nourished. No distress.   HENT:   Right Ear: Tympanic membrane and external ear normal.   Left Ear: Tympanic membrane and external ear normal.   Nose: Nose normal.   Mouth/Throat: Oropharynx is clear and moist. No oropharyngeal exudate.   Eyes: Conjunctivae are normal. Pupils are equal, round, and reactive to light. Right eye exhibits no discharge. Left eye exhibits no discharge.   Neck: Neck supple. No tracheal deviation " "present. No thyromegaly present.   Cardiovascular: Normal rate, regular rhythm, S1 normal, S2 normal, normal heart sounds and normal pulses. Exam reveals no S3, no S4 and no friction rub.   No murmur heard.  Pulmonary/Chest: Effort normal and breath sounds normal. No respiratory distress. She has no wheezes. She has no rales. Right breast exhibits no mass, no nipple discharge and no tenderness. Left breast exhibits no mass, no nipple discharge and no tenderness.   Abdominal: Soft. Bowel sounds are normal. She exhibits no mass. There is no hepatosplenomegaly. There is no tenderness.   Genitourinary: Vagina normal and uterus normal. Cervix exhibits no motion tenderness and no discharge. No vaginal discharge found.   Musculoskeletal: Normal range of motion. She exhibits no edema.   Lymphadenopathy:     She has no cervical adenopathy.   Neurological: She is alert and oriented to person, place, and time. She has normal strength and normal reflexes. She exhibits normal muscle tone.   Skin: Skin is warm and dry. No rash noted.   Psychiatric: She has a normal mood and affect. Judgment and thought content normal. Cognition and memory are normal.     ASSESSMENT/PLAN:   1. Encounter for routine adult health examination without abnormal findings      2. Screening for malignant neoplasm of cervix    - Pap imaged thin layer screen with HPV - recommended age 30 - 65 years (select HPV order below)  - HPV High Risk Types DNA Cervical    3. Atrophic vaginitis  She complains of painful intercourse.  Will try topical estrogen.    - estradiol (ESTRACE) 0.1 MG/GM vaginal cream; Place 2 g vaginally twice a week  Dispense: 48 g; Refill: 3    COUNSELING:  Reviewed preventive health counseling, as reflected in patient instructions    BP Readings from Last 1 Encounters:   12/21/18 128/82     Estimated body mass index is 24.49 kg/m  as calculated from the following:    Height as of this encounter: 1.549 m (5' 1\").    Weight as of this " encounter: 58.8 kg (129 lb 9.6 oz).    BP Screening:   Last 3 BP Readings:    BP Readings from Last 3 Encounters:   12/21/18 128/82   09/26/18 132/70   12/11/17 112/60       The following was recommended to the patient:  Re-screen BP within a year and recommended lifestyle modifications       reports that she quit smoking about 20 years ago. Her smoking use included cigarettes. She smoked 0.00 packs per day for 20.00 years. she has never used smokeless tobacco.      Counseling Resources:  ATP IV Guidelines  Pooled Cohorts Equation Calculator  Breast Cancer Risk Calculator  FRAX Risk Assessment  ICSI Preventive Guidelines  Dietary Guidelines for Americans, 2010  USDA's MyPlate  ASA Prophylaxis  Lung CA Screening    Dayan Bateman MD  [facility]

## 2018-12-22 ASSESSMENT — PATIENT HEALTH QUESTIONNAIRE - PHQ9: SUM OF ALL RESPONSES TO PHQ QUESTIONS 1-9: 0

## 2018-12-22 ASSESSMENT — ANXIETY QUESTIONNAIRES: GAD7 TOTAL SCORE: 0

## 2018-12-27 LAB
COPATH REPORT: NORMAL
PAP: NORMAL

## 2018-12-28 LAB
FINAL DIAGNOSIS: NORMAL
HPV HR 12 DNA CVX QL NAA+PROBE: NEGATIVE
HPV16 DNA SPEC QL NAA+PROBE: NEGATIVE
HPV18 DNA SPEC QL NAA+PROBE: NEGATIVE
SPECIMEN DESCRIPTION: NORMAL
SPECIMEN SOURCE CVX/VAG CYTO: NORMAL

## 2019-07-20 NOTE — PATIENT INSTRUCTIONS
Stop: Ibuprofen (tylenol okay for pain), and all supplements one week prior to surgery.     Before Your Surgery      Call your surgeon if there is any change in your health. This includes signs of a cold or flu (such as a sore throat, runny nose, cough, rash or fever).    Do not smoke, drink alcohol or take over the counter medicine (unless your surgeon or primary care doctor tells you to) for the 24 hours before and after surgery.    If you take prescribed drugs: Follow your doctor s orders about which medicines to take and which to stop until after surgery.    Eating and drinking prior to surgery: follow the instructions from your surgeon    Take a shower or bath the night before surgery. Use the soap your surgeon gave you to gently clean your skin. If you do not have soap from your surgeon, use your regular soap. Do not shave or scrub the surgery site.  Wear clean pajamas and have clean sheets on your bed.    2

## 2019-12-31 NOTE — PROGRESS NOTES
"SUBJECTIVE:   Enedelia Abraham is a 64 year old female who presents for Preventive Visit.  Are you in the first 12 months of your Medicare coverage?  Yes,  Visual Acuity:  Right Eye: 20/40   Left Eye: 20/40  Both Eyes: 20/40    Healthy Habits:     In general, how would you rate your overall health?  Excellent    Frequency of exercise:  4-5 days/week    Duration of exercise:  15-30 minutes    Do you usually eat at least 4 servings of fruit and vegetables a day, include whole grains    & fiber and avoid regularly eating high fat or \"junk\" foods?  Yes    Taking medications regularly:  Yes    Ability to successfully perform activities of daily living:  No assistance needed    Home Safety:  No safety concerns identified    Hearing Impairment:  No hearing concerns    In the past 6 months, have you been bothered by leaking of urine?  No    In general, how would you rate your overall mental or emotional health?  Excellent      PHQ-2 Total Score: 0    Additional concerns today:  No     Do you feel safe in your environment? Yes    Have you ever done Advance Care Planning? (For example, a Health Directive, POLST, or a discussion with a medical provider or your loved ones about your wishes): Yes, advance care planning is on file.    Fall risk  Fallen 2 or more times in the past year?: No  Any fall with injury in the past year?: No    Cognitive Screening   1) Repeat 3 items (Leader, Season, Table)    2) Clock draw: NORMAL  3) 3 item recall: Recalls 3 objects  Results: 3 items recalled: COGNITIVE IMPAIRMENT LESS LIKELY    Mini-CogTM Copyright EMILY Solorio. Licensed by the author for use in [facility]; reprinted with permission (marco a@.[email or web address]). All rights reserved.      Do you have sleep apnea, excessive snoring or daytime drowsiness?: no    Reviewed and updated as needed this visit by clinical staff  Tobacco  Allergies  Meds  Problems  Med Hx  Surg Hx  Fam Hx  Soc Hx          Reviewed and updated as needed this visit " by Provider  Tobacco  Allergies  Meds  Problems  Med Hx  Surg Hx  Fam Hx        Social History     Tobacco Use     Smoking status: Former Smoker     Packs/day: 0.00     Years: 20.00     Pack years: 0.00     Types: Cigarettes     Last attempt to quit: 1998     Years since quittin.0     Smokeless tobacco: Never Used     Tobacco comment:  X 20 years/no smokers in household   Substance Use Topics     Alcohol use: Yes     Comment: rare- <1 drink Q 6 months     If you drink alcohol do you typically have >3 drinks per day or >7 drinks per week? No    Alcohol Use 2020   Prescreen: >3 drinks/day or >7 drinks/week? No   Prescreen: >3 drinks/day or >7 drinks/week? -   No flowsheet data found.    Current providers sharing in care for this patient include:   Patient Care Team:  Dayan Bateman MD as PCP - General  Dayan Bateman MD as Assigned PCP    The following health maintenance items are reviewed in Epic and correct as of today:  Health Maintenance   Topic Date Due     DEXA  1955     HIV SCREENING  10/12/1969     FALL RISK ASSESSMENT  10/12/2019     PNEUMOCOCCAL IMMUNIZATION 65+ LOW/MEDIUM RISK (2 of 2 - PPSV23) 10/29/2020     MEDICARE ANNUAL WELLNESS VISIT  2021     MAMMO SCREENING  2022     LIPID  2022     ADVANCE CARE PLANNING  2023     COLONOSCOPY  07/15/2025     DTAP/TDAP/TD IMMUNIZATION (4 - Td) 2026     HEPATITIS C SCREENING  Completed     PHQ-2  Completed     INFLUENZA VACCINE  Completed     ZOSTER IMMUNIZATION  Completed     IPV IMMUNIZATION  Aged Out     MENINGITIS IMMUNIZATION  Aged Out       Review of Systems   Constitutional: Negative for chills and fever.   HENT: Positive for dental problem (has had implants, now has front tooth issue and has bone grafts, working with dentist). Negative for congestion and ear pain.    Eyes: Negative for pain.   Respiratory: Negative for cough.    Cardiovascular: Negative for chest pain.   Gastrointestinal:  "Negative for abdominal pain, constipation, diarrhea and hematochezia.   Genitourinary: Negative for hematuria.   Neurological: Negative for dizziness.       OBJECTIVE:   /66 (BP Location: Left arm, Patient Position: Chair, Cuff Size: Adult Regular)   Pulse 90   Temp 99.1  F (37.3  C) (Temporal)   Resp 14   Ht 1.549 m (5' 1\")   Wt 59 kg (130 lb)   LMP 04/01/2006   SpO2 99%   BMI 24.56 kg/m   Estimated body mass index is 24.56 kg/m  as calculated from the following:    Height as of this encounter: 1.549 m (5' 1\").    Weight as of this encounter: 59 kg (130 lb).  Physical Exam  Constitutional:       General: She is not in acute distress.     Appearance: She is well-developed.   HENT:      Right Ear: Tympanic membrane and external ear normal.      Left Ear: Tympanic membrane and external ear normal.      Nose: Nose normal.      Mouth/Throat:      Pharynx: No oropharyngeal exudate.   Eyes:      General:         Right eye: No discharge.         Left eye: No discharge.      Conjunctiva/sclera: Conjunctivae normal.      Pupils: Pupils are equal, round, and reactive to light.   Neck:      Musculoskeletal: Neck supple.      Thyroid: No thyromegaly.      Trachea: No tracheal deviation.   Cardiovascular:      Rate and Rhythm: Normal rate and regular rhythm.      Pulses: Normal pulses.      Heart sounds: Normal heart sounds, S1 normal and S2 normal. No murmur. No friction rub. No S3 or S4 sounds.    Pulmonary:      Effort: Pulmonary effort is normal. No respiratory distress.      Breath sounds: Normal breath sounds. No wheezing or rales.   Abdominal:      General: Bowel sounds are normal.      Palpations: Abdomen is soft. There is no mass.      Tenderness: There is no abdominal tenderness.   Musculoskeletal: Normal range of motion.   Lymphadenopathy:      Cervical: No cervical adenopathy.   Skin:     General: Skin is warm and dry.      Findings: No rash.   Neurological:      Mental Status: She is alert and oriented " "to person, place, and time.      Motor: No abnormal muscle tone.      Deep Tendon Reflexes: Reflexes are normal and symmetric.   Psychiatric:         Thought Content: Thought content normal.         Judgment: Judgment normal.         ASSESSMENT / PLAN:   1. Encounter for Medicare annual wellness exam  Will continue to work with dentist, if still having maxillary complaints after her dental work is finished, would then refer to ENT.    2. Estrogen deficiency    - DEXA HIP/PELVIS/SPINE - Future; Future    3. Screening for HIV (human immunodeficiency virus)    - HIV Screening    COUNSELING:  Reviewed preventive health counseling, as reflected in patient instructions    Estimated body mass index is 24.56 kg/m  as calculated from the following:    Height as of this encounter: 1.549 m (5' 1\").    Weight as of this encounter: 59 kg (130 lb).         reports that she quit smoking about 22 years ago. Her smoking use included cigarettes. She smoked 0.00 packs per day for 20.00 years. She has never used smokeless tobacco.      Appropriate preventive services were discussed with this patient, including applicable screening as appropriate for cardiovascular disease, diabetes, osteopenia/osteoporosis, and glaucoma.  As appropriate for age/gender, discussed screening for colorectal cancer, prostate cancer, breast cancer, and cervical cancer. Checklist reviewing preventive services available has been given to the patient.    Reviewed patients plan of care and provided an AVS. The Basic Care Plan (routine screening as documented in Health Maintenance) for Enedelia meets the Care Plan requirement. This Care Plan has been established and reviewed with the Patient.    Counseling Resources:  ATP IV Guidelines  Pooled Cohorts Equation Calculator  Breast Cancer Risk Calculator  FRAX Risk Assessment  ICSI Preventive Guidelines  Dietary Guidelines for Americans, 2010  USDA's MyPlate  ASA Prophylaxis  Lung CA Screening    Dayan Bateman, " MD  [facility] [place]    Identified Health Risks:

## 2020-01-09 ENCOUNTER — ANCILLARY PROCEDURE (OUTPATIENT)
Dept: MAMMOGRAPHY | Facility: OTHER | Age: 65
End: 2020-01-09
Payer: COMMERCIAL

## 2020-01-09 DIAGNOSIS — Z12.31 VISIT FOR SCREENING MAMMOGRAM: ICD-10-CM

## 2020-01-09 PROCEDURE — 77063 BREAST TOMOSYNTHESIS BI: CPT | Mod: TC

## 2020-01-09 PROCEDURE — 77067 SCR MAMMO BI INCL CAD: CPT | Mod: TC

## 2020-01-14 ENCOUNTER — OFFICE VISIT (OUTPATIENT)
Dept: FAMILY MEDICINE | Facility: OTHER | Age: 65
End: 2020-01-14
Payer: COMMERCIAL

## 2020-01-14 VITALS
WEIGHT: 130 LBS | HEART RATE: 90 BPM | SYSTOLIC BLOOD PRESSURE: 110 MMHG | TEMPERATURE: 99.1 F | OXYGEN SATURATION: 99 % | BODY MASS INDEX: 24.55 KG/M2 | RESPIRATION RATE: 14 BRPM | DIASTOLIC BLOOD PRESSURE: 66 MMHG | HEIGHT: 61 IN

## 2020-01-14 DIAGNOSIS — Z00.00 ENCOUNTER FOR MEDICARE ANNUAL WELLNESS EXAM: Primary | ICD-10-CM

## 2020-01-14 DIAGNOSIS — Z11.4 SCREENING FOR HIV (HUMAN IMMUNODEFICIENCY VIRUS): ICD-10-CM

## 2020-01-14 DIAGNOSIS — E28.39 ESTROGEN DEFICIENCY: ICD-10-CM

## 2020-01-14 PROCEDURE — 36415 COLL VENOUS BLD VENIPUNCTURE: CPT | Performed by: FAMILY MEDICINE

## 2020-01-14 PROCEDURE — G0402 INITIAL PREVENTIVE EXAM: HCPCS | Performed by: FAMILY MEDICINE

## 2020-01-14 PROCEDURE — 87389 HIV-1 AG W/HIV-1&-2 AB AG IA: CPT | Performed by: FAMILY MEDICINE

## 2020-01-14 ASSESSMENT — ENCOUNTER SYMPTOMS
FEVER: 0
CHILLS: 0
ABDOMINAL PAIN: 0
DIARRHEA: 0
HEMATURIA: 0
EYE PAIN: 0
CONSTIPATION: 0
DIZZINESS: 0
COUGH: 0
HEMATOCHEZIA: 0

## 2020-01-14 ASSESSMENT — MIFFLIN-ST. JEOR: SCORE: 1072.06

## 2020-01-14 ASSESSMENT — ACTIVITIES OF DAILY LIVING (ADL): CURRENT_FUNCTION: NO ASSISTANCE NEEDED

## 2020-01-14 NOTE — LETTER
January 15, 2020      Enedelia Abraham  [address] [place] [address]        Dear ,    We are writing to inform you of your negative test results.        Resulted Orders   HIV Screening   Result Value Ref Range    HIV Antigen Antibody Combo Nonreactive NR^Nonreactive          Comment:      HIV-1 p24 Ag & HIV-1/HIV-2 Ab Not Detected       If you have any questions or concerns, please call the clinic at the number listed above.       Sincerely,        Dayan Bateman MD

## 2020-01-14 NOTE — PATIENT INSTRUCTIONS
Patient Education   Personalized Prevention Plan  You are due for the preventive services outlined below.  Your care team is available to assist you in scheduling these services.  If you have already completed any of these items, please share that information with your care team to update in your medical record.  Health Maintenance Due   Topic Date Due     Osteoporosis Screening  1955     HIV Screening  10/12/1969     FALL RISK ASSESSMENT  10/12/2019

## 2020-01-15 ENCOUNTER — HOSPITAL ENCOUNTER (OUTPATIENT)
Dept: BONE DENSITY | Facility: CLINIC | Age: 65
Discharge: HOME | End: 2020-01-15
Attending: FAMILY MEDICINE | Admitting: FAMILY MEDICINE
Payer: COMMERCIAL

## 2020-01-15 DIAGNOSIS — E28.39 ESTROGEN DEFICIENCY: ICD-10-CM

## 2020-01-15 LAB — HIV 1+2 AB+HIV1 P24 AG SERPL QL IA: NONREACTIVE

## 2020-01-15 PROCEDURE — 77080 DXA BONE DENSITY AXIAL: CPT

## 2020-01-17 ENCOUNTER — TELEPHONE (OUTPATIENT)
Dept: FAMILY MEDICINE | Facility: OTHER | Age: 65
End: 2020-01-17

## 2020-01-17 NOTE — LETTER
[address]   [facility] [address]  Phone: [phone]    01/20/20    Enedelia Abraham  [address]  [facility] [address]        Eendelia,     We have been trying to reach you via phone and have been unsuccessful.     We have results from your dexa scan and they are below.     A normal T score for patient's is between 1 and -1. Osteoporosis is worse than -2.5. Your score is a -1.4. In regards to your Calcium, Dr. Bateman would recommend taht you take 1 twice daily.     Please let us know if you have any questions or concerns.       Sincerely,  Your [facility] Care Team

## 2020-01-17 NOTE — TELEPHONE ENCOUNTER
----- Message from Dayan Bateman MD sent at 1/17/2020  2:53 PM CST -----  Attempted to call patient and no answer.  The normal T score for patient's is between 1 and -1.  Osteoporosis is worse than -2.5.  Her score is -1.4.  In regards to her calcium I would recommend that she take 1 twice a day.    Electronically signed by Dayan Bateman MD on 1/17/2020

## 2020-02-27 ENCOUNTER — OFFICE VISIT (OUTPATIENT)
Dept: FAMILY MEDICINE | Facility: CLINIC | Age: 65
End: 2020-02-27
Payer: COMMERCIAL

## 2020-02-27 ENCOUNTER — TELEPHONE (OUTPATIENT)
Dept: FAMILY MEDICINE | Facility: OTHER | Age: 65
End: 2020-02-27

## 2020-02-27 VITALS
DIASTOLIC BLOOD PRESSURE: 72 MMHG | RESPIRATION RATE: 16 BRPM | HEART RATE: 68 BPM | SYSTOLIC BLOOD PRESSURE: 120 MMHG | WEIGHT: 131.2 LBS | TEMPERATURE: 99.4 F | HEIGHT: 61 IN | BODY MASS INDEX: 24.77 KG/M2 | OXYGEN SATURATION: 98 %

## 2020-02-27 DIAGNOSIS — S29.9XXA INJURY OF CHEST WALL, INITIAL ENCOUNTER: Primary | ICD-10-CM

## 2020-02-27 PROCEDURE — 99213 OFFICE O/P EST LOW 20 MIN: CPT | Performed by: NURSE PRACTITIONER

## 2020-02-27 RX ORDER — CYCLOBENZAPRINE HCL 5 MG
5 TABLET ORAL
Qty: 25 TABLET | Refills: 0 | Status: SHIPPED | OUTPATIENT
Start: 2020-02-27 | End: 2021-02-09

## 2020-02-27 ASSESSMENT — MIFFLIN-ST. JEOR: SCORE: 1077.5

## 2020-02-27 ASSESSMENT — PAIN SCALES - GENERAL: PAINLEVEL: EXTREME PAIN (8)
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
## 2021-02-04 ENCOUNTER — ANCILLARY PROCEDURE (OUTPATIENT)
Dept: MAMMOGRAPHY | Facility: OTHER | Age: 66
End: 2021-02-04
Payer: COMMERCIAL

## 2021-02-04 DIAGNOSIS — Z12.31 VISIT FOR SCREENING MAMMOGRAM: ICD-10-CM

## 2021-02-04 PROCEDURE — 77067 SCR MAMMO BI INCL CAD: CPT | Mod: TC | Performed by: RADIOLOGY

## 2021-02-04 PROCEDURE — 77063 BREAST TOMOSYNTHESIS BI: CPT | Mod: TC | Performed by: RADIOLOGY

## 2021-02-08 NOTE — PATIENT INSTRUCTIONS
Preventive Health Recommendations    See your health care provider every year to    Review health changes.     Discuss preventive care.      Review your medicines if your doctor has prescribed any.      You no longer need a yearly Pap test unless you've had an abnormal Pap test in the past 10 years. If you have vaginal symptoms, such as bleeding or discharge, be sure to talk with your provider about a Pap test.      Every 1 to 2 years, have a mammogram.  If you are over 69, talk with your health care provider about whether or not you want to continue having screening mammograms.      Every 10 years, have a colonoscopy. Or, have a yearly FIT test (stool test). These exams will check for colon cancer.       Have a cholesterol test every 5 years, or more often if your doctor advises it.       Have a diabetes test (fasting glucose) every three years. If you are at risk for diabetes, you should have this test more often.       At age 65, have a bone density scan (DEXA) to check for osteoporosis (brittle bone disease).    Shots:    Get a flu shot each year.    Get a tetanus shot every 10 years.    Talk to your doctor about your pneumonia vaccines. There are now two you should receive - Pneumovax (PPSV 23) and Prevnar (PCV 13).    Talk to your pharmacist about the shingles vaccine.    Talk to your doctor about the hepatitis B vaccine.    Nutrition:     Eat at least 5 servings of fruits and vegetables each day.      Eat whole-grain bread, whole-wheat pasta and brown rice instead of white grains and rice.      Get adequate about Calcium and Vitamin D.     Lifestyle    Exercise at least 150 minutes a week (30 minutes a day, 5 days a week). This will help you control your weight and prevent disease.      Limit alcohol to one drink per day.      No smoking.       Wear sunscreen to prevent skin cancer.       See your dentist twice a year for an exam and cleaning.      See your eye doctor every 1 to 2 years to screen for  conditions such as glaucoma, macular degeneration, cataracts, etc.    Personalized Prevention Plan  You are due for the preventive services outlined below.  Your care team is available to assist you in scheduling these services.  If you have already completed any of these items, please share that information with your care team to update in your medical record.    Health Maintenance Due   Topic Date Due     PHQ-2  01/01/2021     FALL RISK ASSESSMENT  01/14/2021     Annual Wellness Visit  01/14/2021     Patient Education   Personalized Prevention Plan  You are due for the preventive services outlined below.  Your care team is available to assist you in scheduling these services.  If you have already completed any of these items, please share that information with your care team to update in your medical record.  There are no preventive care reminders to display for this patient.    Preventing Falls at Home  A person can fall for many reasons. Older adults may fall because reaction time slows down as we age. Your muscles and joints may get stiff, weak, or less flexible because of illness, medicines, or a physical condition.   Other health problems that make falls more likely include:     Arthritis    Dizziness or lightheadedness when you stand up (orthostatic hypotension)    History of a stroke    Dizziness    Anemia    Certain medicines taken for mental illness or to control blood pressure.    Problems with balance or gait    Bladder or urinary problems    History of falling    Changes in vision (vision impairment)    Changes in thinking skills and memory (cognitive impairment)  Injuries from a fall can include serious injuries such as broken bones, dislocated joints, internal bleeding and cuts. Injuries like these can limit your independence.   Prevention tips  To help prevent falls and fall-related injuries, follow the tips below.    Floors  To make floors safer:     Put nonskid pads under area rugs.    Remove  small rugs.    Replace worn floor coverings.    Tack carpets firmly to each step on carpeted stairs. Put nonskid strips on the edges of uncarpeted stairs.    Keep floors and stairs free of clutter and cords.    Arrange furniture so there are clear pathways.    Clean up any spills right away.  Bathrooms    To make bathrooms safer:     Install grab bars in the tub or shower.    Apply nonskid strips or put a nonskid rubber mat in the tub or shower.    Sit on a bath chair to bathe.    Use bathmats with nonskid backing.  Lighting  To improve visibility in your home:      Keep a flashlight in each room. Or put a lamp next to the bed within easy reach.    Put nightlights in the bedrooms, hallways, kitchen, and bathrooms.    Make sure all stairways have good lighting.    Take your time when going up and down stairs.    Put handrails on both sides of stairs and in walkways for more support. To prevent injury to your wrist or arm, don t use handrails to pull yourself up.    Install grab bars to pull yourself up.    Move or rearrange items that you use often. This will make them easier to find or reach.    Look at your home to find any safety hazards. Especially look at doorways, walkways, and the driveway. Remove or repair any safety problems that you find.  Other changes to make    Look around to find any safety hazards. Look closely at doorways, walkways, and the driveway. Remove or repair any safety problems that you find.    Wear shoes that fit well.    Take your time when going up and down stairs.    Put handrails on both sides of stairs and in walkways for more support. To prevent injury to your wrist or arm, don t use handrails to pull yourself up.    Install grab bars wherever needed to pull yourself up.    Arrange items that you use often. This will make them easier to find or reach.    [employer] last reviewed this educational content on 3/1/2020    8428-3274 [employer] [address] [facility]  [place] [address]. All rights reserved. This information is not intended as a substitute for professional medical care. Always follow your healthcare professional's instructions.

## 2021-02-08 NOTE — PROGRESS NOTES
"SUBJECTIVE:   Enedelia Abraham is a 65 year old female who presents for Preventive Visit.    Patient has been advised of split billing requirements and indicates understanding: Yes - she has questions about non covered issues.    Are you in the first 12 months of your Medicare coverage?  No    Healthy Habits:     In general, how would you rate your overall health?  Excellent    Frequency of exercise:  4-5 days/week    Duration of exercise:  30-45 minutes    Do you usually eat at least 4 servings of fruit and vegetables a day, include whole grains    & fiber and avoid regularly eating high fat or \"junk\" foods?  Yes    Taking medications regularly:  Yes    Medication side effects:  None    Ability to successfully perform activities of daily living:  No assistance needed    Home Safety:  Lack of grab bars in the bathroom    Hearing Impairment:  No hearing concerns    In the past 6 months, have you been bothered by leaking of urine?  No    In general, how would you rate your overall mental or emotional health?  Good      PHQ-2 Total Score: 2    Additional concerns today:  Yes    Do you feel safe in your environment? Yes    Have you ever done Advance Care Planning? (For example, a Health Directive, POLST, or a discussion with a medical provider or your loved ones about your wishes): Yes, advance care planning is on file.      Fall risk  Fallen 2 or more times in the past year?: No  Any fall with injury in the past year?: Yes  Timed Up and Go Test (>13.5 is fall risk; contact physician) : 6    Cognitive Screening   1) Repeat 3 items (Leader, Season, Table)    2) Clock draw: NORMAL- 12 was out pt recognized   3) 3 item recall: Recalls 2 objects   Results: NORMAL clock, 1-2 items recalled: COGNITIVE IMPAIRMENT LESS LIKELY    Mini-CogTM Copyright EMILY Solorio. Licensed by the author for use in [facility] [employer]; reprinted with permission (marco a@.[email or web address]). All rights reserved.      Do you have sleep apnea, excessive snoring or " daytime drowsiness?: no    Reviewed and updated as needed this visit by clinical staff  Tobacco  Allergies  Meds  Problems  Med Hx  Surg Hx  Fam Hx  Soc Hx          Reviewed and updated as needed this visit by Provider  Tobacco  Allergies  Meds  Problems  Med Hx  Surg Hx  Fam Hx         Social History     Tobacco Use     Smoking status: Former Smoker     Packs/day: 0.00     Years: 20.00     Pack years: 0.00     Types: Cigarettes     Quit date: 1998     Years since quittin.1     Smokeless tobacco: Never Used     Tobacco comment:  X 20 years/no smokers in household   Substance Use Topics     Alcohol use: Yes     Comment: rare- <1 drink Q 6 months       Alcohol Use 2021   Prescreen: >3 drinks/day or >7 drinks/week? No   Prescreen: >3 drinks/day or >7 drinks/week? -     Current providers sharing in care for this patient include:   Patient Care Team:  Dayan Bateman MD as PCP - Chio Dunn APRN CNP as Assigned PCP    The following health maintenance items are reviewed in Epic and correct as of today:  Health Maintenance   Topic Date Due     MEDICARE ANNUAL WELLNESS VISIT  2022     FALL RISK ASSESSMENT  2022     LIPID  2022     MAMMO SCREENING  2023     COLORECTAL CANCER SCREENING  07/15/2025     ADVANCE CARE PLANNING  2026     DTAP/TDAP/TD IMMUNIZATION (4 - Td) 2026     DEXA  01/15/2035     HEPATITIS C SCREENING  Completed     PHQ-2  Completed     INFLUENZA VACCINE  Completed     Pneumococcal Vaccine: 65+ Years  Completed     ZOSTER IMMUNIZATION  Completed     Pneumococcal Vaccine: Pediatrics (0 to 5 Years) and At-Risk Patients (6 to 64 Years)  Aged Out     IPV IMMUNIZATION  Aged Out     MENINGITIS IMMUNIZATION  Aged Out     HEPATITIS B IMMUNIZATION  Aged Out       Review of Systems   Constitutional: Negative for chills and fever.   HENT: Negative for congestion, ear pain, hearing loss and sore throat.    Eyes: Negative for pain and visual  "disturbance.   Respiratory: Negative for cough and shortness of breath.    Cardiovascular: Negative for chest pain, palpitations and peripheral edema.   Gastrointestinal: Negative for abdominal pain, constipation, diarrhea, heartburn, hematochezia and nausea.   Breasts:  Negative for tenderness, breast mass and discharge.   Genitourinary: Negative for dysuria, frequency, genital sores, hematuria, pelvic pain, urgency, vaginal bleeding and vaginal discharge.   Musculoskeletal: Negative for arthralgias, joint swelling and myalgias.   Skin: Negative for rash.   Neurological: Negative for dizziness, weakness, headaches and paresthesias.   Psychiatric/Behavioral: Negative for mood changes. The patient is not nervous/anxious.          OBJECTIVE:   /84   Pulse 94   Temp 98.4  F (36.9  C) (Temporal)   Resp 16   Ht 1.549 m (5' 1\")   Wt 62.1 kg (137 lb)   LMP 04/01/2006   SpO2 100%   BMI 25.89 kg/m   Estimated body mass index is 25.89 kg/m  as calculated from the following:    Height as of this encounter: 1.549 m (5' 1\").    Weight as of this encounter: 62.1 kg (137 lb).  Physical Exam  Constitutional:       General: She is not in acute distress.     Appearance: She is well-developed.   HENT:      Right Ear: Tympanic membrane and external ear normal.      Left Ear: Tympanic membrane and external ear normal.      Nose: Nose normal.      Mouth/Throat:      Pharynx: No oropharyngeal exudate.   Eyes:      General:         Right eye: No discharge.         Left eye: No discharge.      Conjunctiva/sclera: Conjunctivae normal.      Pupils: Pupils are equal, round, and reactive to light.   Neck:      Musculoskeletal: Neck supple.      Thyroid: No thyromegaly.      Trachea: No tracheal deviation.   Cardiovascular:      Rate and Rhythm: Normal rate and regular rhythm.      Pulses: Normal pulses.      Heart sounds: Normal heart sounds, S1 normal and S2 normal. No murmur. No friction rub. No S3 or S4 sounds.    Pulmonary:    " "  Effort: Pulmonary effort is normal. No respiratory distress.      Breath sounds: Normal breath sounds. No wheezing or rales.   Abdominal:      General: Bowel sounds are normal.      Palpations: Abdomen is soft. There is no mass.      Tenderness: There is no abdominal tenderness.   Musculoskeletal: Normal range of motion.   Lymphadenopathy:      Cervical: No cervical adenopathy.   Skin:     General: Skin is warm and dry.      Findings: No rash.   Neurological:      Mental Status: She is alert and oriented to person, place, and time.      Motor: No abnormal muscle tone.      Deep Tendon Reflexes: Reflexes are normal and symmetric.   Psychiatric:         Thought Content: Thought content normal.         Judgment: Judgment normal.           ASSESSMENT / PLAN:       ICD-10-CM    1. Encounter for Medicare annual wellness exam  Z00.00        Patient has been advised of split billing requirements and indicates understanding: No  COUNSELING:  Reviewed preventive health counseling, as reflected in patient instructions    Estimated body mass index is 25.89 kg/m  as calculated from the following:    Height as of this encounter: 1.549 m (5' 1\").    Weight as of this encounter: 62.1 kg (137 lb).        She reports that she quit smoking about 23 years ago. Her smoking use included cigarettes. She smoked 0.00 packs per day for 20.00 years. She has never used smokeless tobacco.      Appropriate preventive services were discussed with this patient, including applicable screening as appropriate for cardiovascular disease, diabetes, osteopenia/osteoporosis, and glaucoma.  As appropriate for age/gender, discussed screening for colorectal cancer, prostate cancer, breast cancer, and cervical cancer. Checklist reviewing preventive services available has been given to the patient.    Reviewed patients plan of care and provided an AVS. The Basic Care Plan (routine screening as documented in Health Maintenance) for Enedelia meets the Care Plan " requirement. This Care Plan has been established and reviewed with the Patient.    Counseling Resources:  ATP IV Guidelines  Pooled Cohorts Equation Calculator  Breast Cancer Risk Calculator  Breast Cancer: Medication to Reduce Risk  FRAX Risk Assessment  ICSI Preventive Guidelines  Dietary Guidelines for Americans, 2010  USDA's MyPlate  ASA Prophylaxis  Lung CA Screening    Dayan Bateman MD  [facility]    Identified Health Risks:    She is at risk for falling and has been provided with information to reduce the risk of falling at home.

## 2021-02-09 ENCOUNTER — OFFICE VISIT (OUTPATIENT)
Dept: FAMILY MEDICINE | Facility: OTHER | Age: 66
End: 2021-02-09
Payer: COMMERCIAL

## 2021-02-09 VITALS
HEIGHT: 61 IN | BODY MASS INDEX: 25.86 KG/M2 | OXYGEN SATURATION: 100 % | TEMPERATURE: 98.4 F | DIASTOLIC BLOOD PRESSURE: 84 MMHG | HEART RATE: 94 BPM | SYSTOLIC BLOOD PRESSURE: 128 MMHG | WEIGHT: 137 LBS | RESPIRATION RATE: 16 BRPM

## 2021-02-09 DIAGNOSIS — Z00.00 ENCOUNTER FOR MEDICARE ANNUAL WELLNESS EXAM: Primary | ICD-10-CM

## 2021-02-09 PROCEDURE — G0439 PPPS, SUBSEQ VISIT: HCPCS | Performed by: FAMILY MEDICINE

## 2021-02-09 RX ORDER — VITAMIN E 268 MG
400 CAPSULE ORAL
COMMUNITY

## 2021-02-09 RX ORDER — ZINC GLUCONATE 50 MG
50 TABLET ORAL DAILY
COMMUNITY

## 2021-02-09 ASSESSMENT — ENCOUNTER SYMPTOMS
ABDOMINAL PAIN: 0
HEMATOCHEZIA: 0
ARTHRALGIAS: 0
CONSTIPATION: 0
COUGH: 0
JOINT SWELLING: 0
HEADACHES: 0
EYE PAIN: 0
BREAST MASS: 0
FEVER: 0
PALPITATIONS: 0
NERVOUS/ANXIOUS: 0
SHORTNESS OF BREATH: 0
WEAKNESS: 0
HEMATURIA: 0
HEARTBURN: 0
NAUSEA: 0
DYSURIA: 0
MYALGIAS: 0
CHILLS: 0
FREQUENCY: 0
DIZZINESS: 0
SORE THROAT: 0
DIARRHEA: 0
PARESTHESIAS: 0

## 2021-02-09 ASSESSMENT — PAIN SCALES - GENERAL: PAINLEVEL: NO PAIN (0)

## 2021-02-09 ASSESSMENT — MIFFLIN-ST. JEOR: SCORE: 1098.81

## 2021-02-09 ASSESSMENT — ACTIVITIES OF DAILY LIVING (ADL): CURRENT_FUNCTION: NO ASSISTANCE NEEDED

## 2021-03-13 ENCOUNTER — IMMUNIZATION (OUTPATIENT)
Dept: FAMILY MEDICINE | Facility: CLINIC | Age: 66
End: 2021-03-13
Payer: COMMERCIAL

## 2021-03-13 PROCEDURE — 91301 PR COVID VAC MODERNA 100 MCG/0.5 ML IM: CPT

## 2021-03-13 PROCEDURE — 0011A PR COVID VAC MODERNA 100 MCG/0.5 ML IM: CPT

## 2021-04-10 ENCOUNTER — IMMUNIZATION (OUTPATIENT)
Dept: FAMILY MEDICINE | Facility: CLINIC | Age: 66
End: 2021-04-10
Attending: FAMILY MEDICINE
Payer: COMMERCIAL

## 2021-04-10 PROCEDURE — 91301 PR COVID VAC MODERNA 100 MCG/0.5 ML IM: CPT

## 2021-04-10 PROCEDURE — 0012A PR COVID VAC MODERNA 100 MCG/0.5 ML IM: CPT

## 2021-10-23 ENCOUNTER — HEALTH MAINTENANCE LETTER (OUTPATIENT)
Age: 66
End: 2021-10-23

## 2022-02-08 ASSESSMENT — ACTIVITIES OF DAILY LIVING (ADL): CURRENT_FUNCTION: NO ASSISTANCE NEEDED

## 2022-02-08 ASSESSMENT — ENCOUNTER SYMPTOMS
SORE THROAT: 0
HEMATOCHEZIA: 0
CONSTIPATION: 0
DIARRHEA: 0
ARTHRALGIAS: 1
EYE PAIN: 0
DIZZINESS: 0
PALPITATIONS: 0
CHILLS: 0
BREAST MASS: 0
FREQUENCY: 0
SHORTNESS OF BREATH: 0
JOINT SWELLING: 0
FEVER: 0
HEADACHES: 1
DYSURIA: 0
PARESTHESIAS: 0
ABDOMINAL PAIN: 0
MYALGIAS: 1
HEARTBURN: 0
COUGH: 0
HEMATURIA: 0
WEAKNESS: 0
NAUSEA: 0

## 2022-02-11 ENCOUNTER — OFFICE VISIT (OUTPATIENT)
Dept: FAMILY MEDICINE | Facility: OTHER | Age: 67
End: 2022-02-11
Payer: COMMERCIAL

## 2022-02-11 VITALS
HEIGHT: 61 IN | OXYGEN SATURATION: 99 % | BODY MASS INDEX: 25.11 KG/M2 | DIASTOLIC BLOOD PRESSURE: 70 MMHG | WEIGHT: 133 LBS | RESPIRATION RATE: 16 BRPM | TEMPERATURE: 98.5 F | SYSTOLIC BLOOD PRESSURE: 118 MMHG | HEART RATE: 69 BPM

## 2022-02-11 DIAGNOSIS — Z00.00 ENCOUNTER FOR MEDICARE ANNUAL WELLNESS EXAM: Primary | ICD-10-CM

## 2022-02-11 PROCEDURE — 99397 PER PM REEVAL EST PAT 65+ YR: CPT | Performed by: FAMILY MEDICINE

## 2022-02-11 ASSESSMENT — ENCOUNTER SYMPTOMS
ABDOMINAL PAIN: 0
DIZZINESS: 0
COUGH: 0
MYALGIAS: 1
EYE PAIN: 0
NAUSEA: 0
BREAST MASS: 0
ARTHRALGIAS: 1
FREQUENCY: 0
WEAKNESS: 0
HEADACHES: 1
DIARRHEA: 0
HEARTBURN: 0
PARESTHESIAS: 0
CHILLS: 0
HEMATOCHEZIA: 0
CONSTIPATION: 0
JOINT SWELLING: 0
DYSURIA: 0
SORE THROAT: 0
PALPITATIONS: 0
FEVER: 0
HEMATURIA: 0
SHORTNESS OF BREATH: 0

## 2022-02-11 ASSESSMENT — MIFFLIN-ST. JEOR: SCORE: 1077.91

## 2022-02-11 ASSESSMENT — ACTIVITIES OF DAILY LIVING (ADL): CURRENT_FUNCTION: NO ASSISTANCE NEEDED

## 2022-02-11 ASSESSMENT — PAIN SCALES - GENERAL: PAINLEVEL: NO PAIN (0)

## 2022-02-11 NOTE — PATIENT INSTRUCTIONS
Patient Education   Personalized Prevention Plan  You are due for the preventive services outlined below.  Your care team is available to assist you in scheduling these services.  If you have already completed any of these items, please share that information with your care team to update in your medical record.  Health Maintenance Due   Topic Date Due     FALL RISK ASSESSMENT  02/09/2022

## 2022-02-11 NOTE — PROGRESS NOTES
"SUBJECTIVE:   Enedelia Abraham is a 66 year old female who presents for Preventive Visit.    Patient has been advised of split billing requirements and indicates understanding: Yes  Are you in the first 12 months of your Medicare coverage?  No    Healthy Habits:     In general, how would you rate your overall health?  Excellent    Frequency of exercise:  4-5 days/week    Duration of exercise:  30-45 minutes    Do you usually eat at least 4 servings of fruit and vegetables a day, include whole grains    & fiber and avoid regularly eating high fat or \"junk\" foods?  Yes    Taking medications regularly:  Yes    Medication side effects:  Not applicable    Ability to successfully perform activities of daily living:  No assistance needed    Home Safety:  No safety concerns identified    Hearing Impairment:  No hearing concerns    In the past 6 months, have you been bothered by leaking of urine?  No    In general, how would you rate your overall mental or emotional health?  Good      PHQ-2 Total Score: 0    Additional concerns today:  Yes    Do you feel safe in your environment? Yes    Have you ever done Advance Care Planning? (For example, a Health Directive, POLST, or a discussion with a medical provider or your loved ones about your wishes): Yes, advance care planning is on file.       Fall risk  Fallen 2 or more times in the past year?: No  Any fall with injury in the past year?: No    Cognitive Screening   1) Repeat 3 items (Leader, Season, Table)    2) Clock draw: NORMAL  3) 3 item recall: Recalls 3 objects  Results: 3 items recalled: COGNITIVE IMPAIRMENT LESS LIKELY    Mini-CogTM Copyright EMILY Solorio. Licensed by the author for use in [facility]; reprinted with permission ([email or web address] [email or web address]). All rights reserved.      Do you have sleep apnea, excessive snoring or daytime drowsiness?: no    Reviewed and updated as needed this visit by clinical staff  Tobacco  Allergies  Meds  Problems  Med Hx  Surg Hx  Fam Hx "  Soc Hx         Reviewed and updated as needed this visit by Provider  Tobacco  Allergies  Meds  Problems  Med Hx  Surg Hx  Fam Hx        Social History     Tobacco Use     Smoking status: Former Smoker     Packs/day: 0.00     Years: 20.00     Pack years: 0.00     Types: Cigarettes     Quit date: 1998     Years since quittin.1     Smokeless tobacco: Never Used     Tobacco comment:  X 20 years/no smokers in household   Substance Use Topics     Alcohol use: Yes     Comment: rare- <1 drink Q 6 months     If you drink alcohol do you typically have >3 drinks per day or >7 drinks per week? No    Alcohol Use 2022   Prescreen: >3 drinks/day or >7 drinks/week? -   Prescreen: >3 drinks/day or >7 drinks/week? No               Current providers sharing in care for this patient include:   Patient Care Team:  Dayan Bateman MD as PCP - General  Dayan Bateman MD as Assigned PCP    The following health maintenance items are reviewed in Epic and correct as of today:  Health Maintenance Due   Topic Date Due     FALL RISK ASSESSMENT  2022         FHS-7:   Breast CA Risk Assessment (FHS-7) 2022   Did any of your first-degree relatives have breast or ovarian cancer? Yes   Did any of your relatives have bilateral breast cancer? Unknown   Did any man in your family have breast cancer? No   Did any woman in your family have breast and ovarian cancer? Yes   Did any woman in your family have breast cancer before age 50 y? Yes   Do you have 2 or more relatives with breast and/or ovarian cancer? Unknown   Do you have 2 or more relatives with breast and/or bowel cancer? Unknown       Mammogram Screening: Recommended mammography every 1-2 years with patient discussion and risk factor consideration  Pertinent mammograms are reviewed under the imaging tab.    Review of Systems   Constitutional: Negative for chills and fever.   HENT: Negative for congestion, ear pain, hearing loss and sore throat.   "  Eyes: Negative for pain and visual disturbance.   Respiratory: Negative for cough and shortness of breath.    Cardiovascular: Negative for chest pain, palpitations and peripheral edema.   Gastrointestinal: Negative for abdominal pain, constipation, diarrhea, heartburn, hematochezia and nausea.   Breasts:  Negative for tenderness, breast mass and discharge.   Genitourinary: Negative for dysuria, frequency, genital sores, hematuria, pelvic pain, urgency, vaginal bleeding and vaginal discharge.   Musculoskeletal: Positive for arthralgias and myalgias. Negative for joint swelling.        Following with chiropractor, pulled hamstring, thought it was healing, then re-injured it again.    Skin: Negative for rash.   Neurological: Positive for headaches (more frequent lately, might be related to stress). Negative for dizziness, weakness and paresthesias.   Psychiatric/Behavioral: Negative for mood changes.        OBJECTIVE:   /70   Pulse 69   Temp 98.5  F (36.9  C) (Temporal)   Resp 16   Ht 1.553 m (5' 1.14\")   Wt 60.3 kg (133 lb)   LMP 04/01/2006   SpO2 99%   BMI 25.01 kg/m   Estimated body mass index is 25.01 kg/m  as calculated from the following:    Height as of this encounter: 1.553 m (5' 1.14\").    Weight as of this encounter: 60.3 kg (133 lb).  Physical Exam  Constitutional:       General: She is not in acute distress.     Appearance: She is well-developed.   HENT:      Right Ear: Tympanic membrane and external ear normal.      Left Ear: Tympanic membrane and external ear normal.      Nose: Nose normal.   Eyes:      General:         Right eye: No discharge.         Left eye: No discharge.      Conjunctiva/sclera: Conjunctivae normal.      Pupils: Pupils are equal, round, and reactive to light.   Neck:      Thyroid: No thyromegaly.      Trachea: No tracheal deviation.   Cardiovascular:      Rate and Rhythm: Normal rate and regular rhythm.      Heart sounds: Normal heart sounds, S1 normal and S2 normal. " "No murmur heard.      Pulmonary:      Effort: Pulmonary effort is normal. No respiratory distress.      Breath sounds: Normal breath sounds. No wheezing or rales.   Abdominal:      General: Bowel sounds are normal.      Palpations: Abdomen is soft. There is no mass.      Tenderness: There is no abdominal tenderness.   Musculoskeletal:         General: Normal range of motion.      Cervical back: Neck supple.   Lymphadenopathy:      Cervical: No cervical adenopathy.   Skin:     General: Skin is warm and dry.      Findings: No rash.   Neurological:      Mental Status: She is alert and oriented to person, place, and time.      Deep Tendon Reflexes: Reflexes are normal and symmetric.   Psychiatric:         Thought Content: Thought content normal.         Judgment: Judgment normal.           ASSESSMENT / PLAN:       ICD-10-CM    1. Encounter for Medicare annual wellness exam  Z00.00        COUNSELING:  Reviewed preventive health counseling, as reflected in patient instructions    Estimated body mass index is 25.01 kg/m  as calculated from the following:    Height as of this encounter: 1.553 m (5' 1.14\").    Weight as of this encounter: 60.3 kg (133 lb).    Weight management plan: Discussed healthy diet and exercise guidelines    She reports that she quit smoking about 24 years ago. Her smoking use included cigarettes. She smoked 0.00 packs per day for 20.00 years. She has never used smokeless tobacco.      Appropriate preventive services were discussed with this patient, including applicable screening as appropriate for cardiovascular disease, diabetes, osteopenia/osteoporosis, and glaucoma.  As appropriate for age/gender, discussed screening for colorectal cancer, prostate cancer, breast cancer, and cervical cancer. Checklist reviewing preventive services available has been given to the patient.    Reviewed patients plan of care and provided an AVS. The Basic Care Plan (routine screening as documented in Health " Maintenance) for Enedelia meets the Care Plan requirement. This Care Plan has been established and reviewed with the Patient.    Counseling Resources:  ATP IV Guidelines  Pooled Cohorts Equation Calculator  Breast Cancer Risk Calculator  Breast Cancer: Medication to Reduce Risk  FRAX Risk Assessment  ICSI Preventive Guidelines  Dietary Guidelines for Americans, 2010  USDA's MyPlate  ASA Prophylaxis  Lung CA Screening    Dayan Bateman MD  [facility]    Identified Health Risks:

## 2022-03-31 ENCOUNTER — ANCILLARY PROCEDURE (OUTPATIENT)
Dept: MAMMOGRAPHY | Facility: OTHER | Age: 67
End: 2022-03-31
Attending: FAMILY MEDICINE
Payer: COMMERCIAL

## 2022-03-31 DIAGNOSIS — Z12.31 VISIT FOR SCREENING MAMMOGRAM: ICD-10-CM

## 2022-03-31 PROCEDURE — 77063 BREAST TOMOSYNTHESIS BI: CPT | Mod: TC | Performed by: RADIOLOGY

## 2022-03-31 PROCEDURE — 77067 SCR MAMMO BI INCL CAD: CPT | Mod: TC | Performed by: RADIOLOGY

## 2022-05-12 ENCOUNTER — NURSE TRIAGE (OUTPATIENT)
Dept: FAMILY MEDICINE | Facility: OTHER | Age: 67
End: 2022-05-12
Payer: COMMERCIAL

## 2022-05-12 NOTE — TELEPHONE ENCOUNTER
Patient is hoping to be worked in. She has an appointment on 5/20 but states that she is having right sided pain over her whole body and rates her pain at a 10 at night and 7-8 during the day. Tried going to chiropractor but continues to get worse over the last 3 weeks. She states that she has been taking a lot of tylenol and ibprophin then she has in the past. Cant sleep through the night.       please leave message on cell phone or home phone if she does not answer.

## 2022-05-13 NOTE — TELEPHONE ENCOUNTER
"Nurse Triage SBAR  Is this a 2nd Level Triage? NO    SITUATION:                                                      Enedelia Abraham is a 66 year old female reports RLE aches intermittently since February, worse over the last 2 weeks.     BACKGROUND:                                                      Current Medication related to illness: Tylenol, Ibuprofen, muscle cream - all help with aching in her RLE    Hx: Patient reports she has had intermittent aches to her RLE. Symptoms started around January / February. She has been seeing a chiropractor, who told her he feels she might have a pinched nerve. She states he also mentioned she had some weakness in the RLE vs the LLE. Patient states she was doing new exercises with leg weights moving outwards from her body when the aching started. Some days she has no aches / pains, other days it is severe. Currently rates pain as \"mild\".     Patient has noticed an increase in aching / pain over the last two weeks. No obvious s/s of a DVT reported (no swelling, no tenderness on palpation, no changes to the skin color, no redness or warmth). \"Aching\" is worse when she lays flat, worse at night. States she does have a history of sciatica nerve problems, and has had back surgery in the past. Aching is from her hip down into her ankle.     NURSE ASSESSMENT:                                                      In clinic visit needed to assess patient's symptoms.     (See information below for more triage details.)  RECOMMENDATION(S) and PLAN:                                                      Protocol Recommended Disposition: See in 72 hours - Appointment scheduled for Monday 5/16/22    Will comply with recommendation: yes    Encourage to return call clinic triage nurse if further questions/concerns that may come up or if symptoms do not improve, worsen, or new symptoms develop.    NOTES: Disposition was determined by the first positive assessment question, therefore all previous " assessment questions were negative.  Guideline used: Leg Pain-A-OH    Nancy Beverly RN on 5/13/2022 at 4:36 PM    Reason for Disposition    MODERATE pain (e.g., interferes with normal activities, limping) and present > 3 days    Additional Information    Negative: Looks like a broken bone or dislocated joint (e.g., crooked or deformed)    Negative: Sounds like a life-threatening emergency to the triager    Negative: Followed a hip injury    Negative: Followed a knee injury    Negative: Followed an ankle or foot injury    Negative: Back pain radiating (shooting) into leg(s)    Negative: Foot pain is the main symptom    Negative: Ankle pain is the main symptom    Negative: Knee pain is the main symptom    Negative: Leg swelling is the main symptom    Negative: Chest pain    Negative: Difficulty breathing    Negative: Entire foot is cool or blue in comparison to other side    Negative: Unable to walk    Negative: Fever and red area (or area very tender to touch)    Negative: Fever and swollen joint    Negative: Thigh or calf pain in only one leg and present > 1 hour    Negative: Thigh, calf, or ankle swelling in only one leg    Negative: Thigh, calf, or ankle swelling in both legs, but one side is definitely more swollen    Negative: History of prior 'blood clot' in leg or lungs (i.e., deep vein thrombosis, pulmonary embolism)    Negative: History of inherited increased risk of blood clots (e.g., factor 5 Leiden, antithrombin 3, protein C or protein S deficiency, prothrombin mutation)    Negative: Major surgery in the past month    Negative: Hip or leg fracture (broken bone) in past month (or had cast on leg or ankle in past month)    Negative: Illness requiring prolonged bedrest in past month (e.g., immobilization, long hospital stay)    Negative: Long-distance travel in past month (e.g., car, bus, train, plane; with trip lasting 6 or more hours)    Negative: Cancer treatment in the past two months (or has cancer  now)    Negative: Patient sounds very sick or weak to the triager    Negative: SEVERE pain (e.g., excruciating, unable to do any normal activities)    Negative: Cast on leg or ankle and now has increasing pain    Negative: Red area or streak and large (> 2 in. or 5 cm)    Negative: Painful rash with multiple small blisters grouped together (i.e., dermatomal distribution or 'band' or 'stripe')    Negative: Looks like a boil, infected sore, deep ulcer, or other infected rash (spreading redness, pus)    Negative: Localized rash is very painful (no fever)    Negative: Numbness in a leg or foot (i.e., loss of sensation)    Negative: Localized pain, redness or hard lump along vein    Negative: Patient wants to be seen    Protocols used: LEG PAIN-A-OH

## 2022-05-13 NOTE — TELEPHONE ENCOUNTER
Please triage--I don't have any openings until 5/20 either--patient currently scheduled with [facility] on 5/20.  Perhaps there are other colleagues or clinics that can see her earlier?

## 2022-05-16 ENCOUNTER — OFFICE VISIT (OUTPATIENT)
Dept: FAMILY MEDICINE | Facility: OTHER | Age: 67
End: 2022-05-16
Payer: COMMERCIAL

## 2022-05-16 VITALS
TEMPERATURE: 98.3 F | OXYGEN SATURATION: 99 % | WEIGHT: 138 LBS | HEART RATE: 80 BPM | RESPIRATION RATE: 18 BRPM | BODY MASS INDEX: 25.95 KG/M2 | SYSTOLIC BLOOD PRESSURE: 128 MMHG | DIASTOLIC BLOOD PRESSURE: 80 MMHG

## 2022-05-16 DIAGNOSIS — M79.651 PAIN OF RIGHT THIGH: Primary | ICD-10-CM

## 2022-05-16 DIAGNOSIS — M54.50 LUMBAR PAIN: ICD-10-CM

## 2022-05-16 PROCEDURE — 99214 OFFICE O/P EST MOD 30 MIN: CPT | Performed by: STUDENT IN AN ORGANIZED HEALTH CARE EDUCATION/TRAINING PROGRAM

## 2022-05-16 ASSESSMENT — PATIENT HEALTH QUESTIONNAIRE - PHQ9
10. IF YOU CHECKED OFF ANY PROBLEMS, HOW DIFFICULT HAVE THESE PROBLEMS MADE IT FOR YOU TO DO YOUR WORK, TAKE CARE OF THINGS AT HOME, OR GET ALONG WITH OTHER PEOPLE: SOMEWHAT DIFFICULT
SUM OF ALL RESPONSES TO PHQ QUESTIONS 1-9: 8
SUM OF ALL RESPONSES TO PHQ QUESTIONS 1-9: 8

## 2022-05-16 NOTE — PROGRESS NOTES
Assessment & Plan     Pain of right thigh  Lumbar pain  I do think that the patient has some possible piriformis syndrome and/or IT band syndrome on the right side.  Of note she does have previous history of L5 vertebral fracture along with lumbar spinal fusion of L5-S1 in the early 2000's.  She does have no red flag symptoms today.  We did discuss these if they were to arise for her to be seeking emergent care and she understands.  I would like her to start on physical therapy, home stretches, over-the-counter and topical medication moving forward.  We will see how things are in 4 to 6 weeks, will consider possibly referral to release to reevaluate her back and possible MRI.  Of note, I do not think the patient has true weakness on exam, more so effort is difficult due to discomfort.  - Physical Therapy Referral; Future    In 4 to 6 weeks or if symptoms worsen    I have spent 30 or more minutes face-to-face with the patient and coordinating care such as reviewing documentation, results, or having discussions over the phone.      ERICKSON BOTELLO MD  [facility] BERNA Mcdaniels is a 66 year old who presents for the following health issues     Musculoskeletal Problem    History of Present Illness       Mental Health Follow-up:                    Today's PHQ-9         PHQ-9 Total Score: 8  PHQ-9 Q9 Thoughts of better off dead/self-harm past 2 weeks :   (P) Not at all    How difficult have these problems made it for you to do your work, take care of things at home, or get along with other people: Somewhat difficult        Reason for visit:  Leg pain  Symptom onset:  More than a month  Symptom intensity:  Severe  Symptom progression:  Worsening  Had these symptoms before:  Yes  Has tried/received treatment for these symptoms:  Yes  Previous treatment was successful:  Yes  Prior treatment description:  Surgery  What makes it worse:  Sleeping standing  What makes it better:  Pain  relievers    She eats 2-3 servings of fruits and vegetables daily.She consumes 0 sweetened beverage(s) daily.She exercises with enough effort to increase her heart rate 20 to 29 minutes per day.  She exercises with enough effort to increase her heart rate 4 days per week.   She is taking medications regularly.       Review of Systems   Constitutional, HEENT, cardiovascular, pulmonary, gi and gu systems are negative, except as otherwise noted.      Objective    /80   Pulse 80   Temp 98.3  F (36.8  C) (Temporal)   Resp 18   Wt 62.6 kg (138 lb)   LMP 04/01/2006   SpO2 99%   BMI 25.95 kg/m    Body mass index is 25.95 kg/m .  Physical Exam  Vitals and nursing note reviewed.   Constitutional:       General: She is not in acute distress.     Appearance: Normal appearance. She is not ill-appearing, toxic-appearing or diaphoretic.   HENT:      Head: Normocephalic and atraumatic.      Right Ear: Tympanic membrane, ear canal and external ear normal. There is no impacted cerumen.      Left Ear: Tympanic membrane, ear canal and external ear normal. There is no impacted cerumen.      Nose: Nose normal. No congestion or rhinorrhea.      Mouth/Throat:      Mouth: Mucous membranes are moist.      Pharynx: Oropharynx is clear. No oropharyngeal exudate or posterior oropharyngeal erythema.   Eyes:      General:         Right eye: No discharge.         Left eye: No discharge.      Extraocular Movements: Extraocular movements intact.      Conjunctiva/sclera: Conjunctivae normal.      Pupils: Pupils are equal, round, and reactive to light.   Cardiovascular:      Rate and Rhythm: Normal rate and regular rhythm.      Heart sounds: No murmur heard.  Pulmonary:      Effort: Pulmonary effort is normal. No respiratory distress.      Breath sounds: Normal breath sounds.   Musculoskeletal:         General: Normal range of motion.      Cervical back: Normal range of motion.   Lymphadenopathy:      Cervical: No cervical adenopathy.    Neurological:      Mental Status: She is alert.      Comments: Strength slightly asymmetric but related to pain/discomfort while performing exam.  Right lower extremity slightly weak compared to left.  Normal sensation to distal bilateral lower extremities.  Normal/symmetric patellar reflexes 2+.  Slight/subtle swelling of the right ankle compared to the left, no pitting.  Negative Homans' sign and negative Rolon test.  Straight leg raise right side difficult to do due to discomfort.  Negative MAYTE and FADIR testing.  Normal range of motion of bilateral hips with no pain.  Slight tender to palpation over the right sacral area, over the area of the piriformis muscle.  No tenderness palpation over lumbar spine, no step-offs, no paravertebral tenderness.  Previous surgical scarring that is healed well over the midline and over the right paralumbar   Psychiatric:         Mood and Affect: Mood normal.         Behavior: Behavior normal.         Thought Content: Thought content normal.

## 2022-05-17 ASSESSMENT — PATIENT HEALTH QUESTIONNAIRE - PHQ9: SUM OF ALL RESPONSES TO PHQ QUESTIONS 1-9: 8

## 2022-05-18 ENCOUNTER — TELEPHONE (OUTPATIENT)
Dept: FAMILY MEDICINE | Facility: OTHER | Age: 67
End: 2022-05-18
Payer: COMMERCIAL

## 2022-05-18 DIAGNOSIS — M79.651 PAIN OF RIGHT THIGH: Primary | ICD-10-CM

## 2022-05-18 DIAGNOSIS — M54.50 LUMBAR PAIN: ICD-10-CM

## 2022-05-18 NOTE — TELEPHONE ENCOUNTER
New referral was placed to [facility] orthopedics in [place]      Thank you,    Waylon Espitia MD

## 2022-05-18 NOTE — TELEPHONE ENCOUNTER
Reason for Call:  Other call back    Detailed comments: Patient called stating that Dr. Espitia had sent a referral to [facility] physical therapy and she would like the referral sent to [facility] in [place].     Phone Number Patient can be reached at: Home number on file [phone] (home)    Best Time: any    Can we leave a detailed message on this number? YES    Call taken on 5/18/2022 at 8:05 AM by Jessy Herrera

## 2022-06-21 ENCOUNTER — TRANSFERRED RECORDS (OUTPATIENT)
Dept: HEALTH INFORMATION MANAGEMENT | Facility: CLINIC | Age: 67
End: 2022-06-21

## 2022-06-22 ENCOUNTER — TELEPHONE (OUTPATIENT)
Dept: FAMILY MEDICINE | Facility: OTHER | Age: 67
End: 2022-06-22

## 2022-06-22 NOTE — TELEPHONE ENCOUNTER
Reason for Call:  Form, our goal is to have forms completed with 72 hours, however, some forms may require a visit or additional information.    Type of letter, form or note:  medical    Who is the form from?: [facility] (if other please explain)    Where did the form come from: form was faxed in    What clinic location was the form placed at?: [facility] [phone]    Where the form was placed: Team [employer] bin    What number is listed as a contact on the form?: fax [phone]       Additional comments: please complete and fax     Call taken on 6/22/2022 at 9:48 AM by Michelle Garsia

## 2022-10-09 ENCOUNTER — HEALTH MAINTENANCE LETTER (OUTPATIENT)
Age: 67
End: 2022-10-09

## 2022-10-26 ENCOUNTER — MEDICAL CORRESPONDENCE (OUTPATIENT)
Dept: FAMILY MEDICINE | Facility: OTHER | Age: 67
End: 2022-10-26

## 2022-10-26 ENCOUNTER — TRANSFERRED RECORDS (OUTPATIENT)
Dept: HEALTH INFORMATION MANAGEMENT | Facility: CLINIC | Age: 67
End: 2022-10-26

## 2023-03-17 ASSESSMENT — ENCOUNTER SYMPTOMS
EYE PAIN: 0
CHILLS: 0
BREAST MASS: 0
SHORTNESS OF BREATH: 0
NAUSEA: 0
HEMATURIA: 0
WEAKNESS: 0
COUGH: 0
SORE THROAT: 0
DYSURIA: 0
DIZZINESS: 0
JOINT SWELLING: 0
MYALGIAS: 0
ARTHRALGIAS: 1
HEMATOCHEZIA: 0
PALPITATIONS: 0
FEVER: 0
HEADACHES: 0
FREQUENCY: 0
DIARRHEA: 0
ABDOMINAL PAIN: 0
CONSTIPATION: 0
NERVOUS/ANXIOUS: 0
HEARTBURN: 0
PARESTHESIAS: 0

## 2023-03-17 ASSESSMENT — ACTIVITIES OF DAILY LIVING (ADL): CURRENT_FUNCTION: NO ASSISTANCE NEEDED

## 2023-03-24 ENCOUNTER — MYC MEDICAL ADVICE (OUTPATIENT)
Dept: FAMILY MEDICINE | Facility: OTHER | Age: 68
End: 2023-03-24

## 2023-03-24 ENCOUNTER — ANCILLARY PROCEDURE (OUTPATIENT)
Dept: GENERAL RADIOLOGY | Facility: OTHER | Age: 68
End: 2023-03-24
Attending: FAMILY MEDICINE
Payer: COMMERCIAL

## 2023-03-24 ENCOUNTER — OFFICE VISIT (OUTPATIENT)
Dept: FAMILY MEDICINE | Facility: OTHER | Age: 68
End: 2023-03-24
Payer: COMMERCIAL

## 2023-03-24 VITALS
TEMPERATURE: 98.3 F | WEIGHT: 139.5 LBS | DIASTOLIC BLOOD PRESSURE: 78 MMHG | BODY MASS INDEX: 26.34 KG/M2 | RESPIRATION RATE: 14 BRPM | OXYGEN SATURATION: 99 % | HEART RATE: 76 BPM | SYSTOLIC BLOOD PRESSURE: 126 MMHG | HEIGHT: 61 IN

## 2023-03-24 DIAGNOSIS — Z12.31 VISIT FOR SCREENING MAMMOGRAM: ICD-10-CM

## 2023-03-24 DIAGNOSIS — Z00.00 ENCOUNTER FOR MEDICARE ANNUAL WELLNESS EXAM: Primary | ICD-10-CM

## 2023-03-24 DIAGNOSIS — Z23 HIGH PRIORITY FOR 2019-NCOV VACCINE: ICD-10-CM

## 2023-03-24 DIAGNOSIS — M25.531 RIGHT WRIST PAIN: ICD-10-CM

## 2023-03-24 DIAGNOSIS — M25.531 RIGHT WRIST PAIN: Primary | ICD-10-CM

## 2023-03-24 DIAGNOSIS — Z78.0 MENOPAUSE: ICD-10-CM

## 2023-03-24 LAB
CHOLEST SERPL-MCNC: 227 MG/DL
FASTING STATUS PATIENT QL REPORTED: YES
GLUCOSE SERPL-MCNC: 112 MG/DL (ref 70–99)
HDLC SERPL-MCNC: 80 MG/DL
LDLC SERPL CALC-MCNC: 129 MG/DL
NONHDLC SERPL-MCNC: 147 MG/DL
TRIGL SERPL-MCNC: 91 MG/DL

## 2023-03-24 PROCEDURE — 73110 X-RAY EXAM OF WRIST: CPT | Mod: TC | Performed by: RADIOLOGY

## 2023-03-24 PROCEDURE — 80061 LIPID PANEL: CPT | Performed by: FAMILY MEDICINE

## 2023-03-24 PROCEDURE — 0134A COVID-19 VACCINE BIVALENT BOOSTER 18+ (MODERNA): CPT | Performed by: FAMILY MEDICINE

## 2023-03-24 PROCEDURE — 36415 COLL VENOUS BLD VENIPUNCTURE: CPT | Performed by: FAMILY MEDICINE

## 2023-03-24 PROCEDURE — G0439 PPPS, SUBSEQ VISIT: HCPCS | Performed by: FAMILY MEDICINE

## 2023-03-24 PROCEDURE — 90715 TDAP VACCINE 7 YRS/> IM: CPT | Performed by: FAMILY MEDICINE

## 2023-03-24 PROCEDURE — 91313 COVID-19 VACCINE BIVALENT BOOSTER 18+ (MODERNA): CPT | Performed by: FAMILY MEDICINE

## 2023-03-24 PROCEDURE — 99213 OFFICE O/P EST LOW 20 MIN: CPT | Mod: 25 | Performed by: FAMILY MEDICINE

## 2023-03-24 PROCEDURE — 82947 ASSAY GLUCOSE BLOOD QUANT: CPT | Performed by: FAMILY MEDICINE

## 2023-03-24 PROCEDURE — 90471 IMMUNIZATION ADMIN: CPT | Performed by: FAMILY MEDICINE

## 2023-03-24 ASSESSMENT — ENCOUNTER SYMPTOMS
PARESTHESIAS: 0
HEMATOCHEZIA: 0
MYALGIAS: 0
CONSTIPATION: 0
ARTHRALGIAS: 1
ABDOMINAL PAIN: 0
HEMATURIA: 0
HEARTBURN: 0
SHORTNESS OF BREATH: 0
FEVER: 0
FREQUENCY: 0
DIZZINESS: 0
COUGH: 0
HEADACHES: 0
SORE THROAT: 0
PALPITATIONS: 0
WEAKNESS: 0
NERVOUS/ANXIOUS: 0
DYSURIA: 0
DIARRHEA: 0
EYE PAIN: 0
BREAST MASS: 0
NAUSEA: 0
CHILLS: 0
JOINT SWELLING: 0

## 2023-03-24 ASSESSMENT — ACTIVITIES OF DAILY LIVING (ADL): CURRENT_FUNCTION: NO ASSISTANCE NEEDED

## 2023-03-24 ASSESSMENT — PAIN SCALES - GENERAL: PAINLEVEL: NO PAIN (0)

## 2023-03-24 NOTE — PROGRESS NOTES
Prior to immunization administration, verified patients identity using patient s name and date of birth. Please see Immunization Activity for additional information.     Screening Questionnaire for Adult Immunization    Are you sick today?   No   Do you have allergies to medications, food, a vaccine component or latex?   No   Have you ever had a serious reaction after receiving a vaccination?   No   Do you have a long-term health problem with heart, lung, kidney, or metabolic disease (e.g., diabetes), asthma, a blood disorder, no spleen, complement component deficiency, a cochlear implant, or a spinal fluid leak?  Are you on long-term aspirin therapy?   No   Do you have cancer, leukemia, HIV/AIDS, or any other immune system problem?   No   Do you have a parent, brother, or sister with an immune system problem?   No   In the past 3 months, have you taken medications that affect  your immune system, such as prednisone, other steroids, or anticancer drugs; drugs for the treatment of rheumatoid arthritis, Crohn s disease, or psoriasis; or have you had radiation treatments?   No   Have you had a seizure, or a brain or other nervous system problem?   No   During the past year, have you received a transfusion of blood or blood    products, or been given immune (gamma) globulin or antiviral drug?   No   For women: Are you pregnant or is there a chance you could become       pregnant during the next month?   No   Have you received any vaccinations in the past 4 weeks?   No     Immunization questionnaire answers were all negative.  Injection of Tdap given by Lashawn WINSTON. Patient instructed to remain in clinic for 15 minutes afterwards, and to report any adverse reactions.     Screening performed by Lashawn WINSTON on 3/24/2023 at 9:40 AM.

## 2023-03-24 NOTE — PROGRESS NOTES
"SUBJECTIVE:   Enedelia is a 67 year old who presents for Preventive Visit.  Additional Questions 3/24/2023   Roomed by Lashawn   Patient has been advised of split billing requirements and indicates understanding: Yes  Are you in the first 12 months of your Medicare coverage?  No    Healthy Habits:     In general, how would you rate your overall health?  Good    Frequency of exercise:  2-3 days/week    Duration of exercise:  30-45 minutes    Do you usually eat at least 4 servings of fruit and vegetables a day, include whole grains    & fiber and avoid regularly eating high fat or \"junk\" foods?  Yes    Taking medications regularly:  Yes    Medication side effects:  Not applicable    Ability to successfully perform activities of daily living:  No assistance needed    Home Safety:  Lack of grab bars in the bathroom    Hearing Impairment:  No hearing concerns    In the past 6 months, have you been bothered by leaking of urine? Yes    In general, how would you rate your overall mental or emotional health?  Good      PHQ-2 Total Score: 0    Additional concerns today:  Yes (1. slipped hurt wrist 2. Bump on labia, x1 yr. Started out little and bit bigger, sometime hurts when wipes. No bleeding, 3. Mammogram 4. Labs 5. Whooping cough vaccine for grandbaby.  5. bone density test )    Have you ever done Advance Care Planning? (For example, a Health Directive, POLST, or a discussion with a medical provider or your loved ones about your wishes): Yes, advance care planning is on file.       Fall risk  Fallen 2 or more times in the past year?: No  Any fall with injury in the past year?: No    Cognitive Screening   1) Repeat 3 items (Leader, Season, Table)    2) Clock draw: NORMAL  3) 3 item recall: Recalls 3 objects  Results: 3 items recalled: COGNITIVE IMPAIRMENT LESS LIKELY    Mini-CogTM Copyright EMILY Solorio. Licensed by the author for use in [facility]; reprinted with permission (marco a@.edu). All rights reserved.      Do " you have sleep apnea, excessive snoring or daytime drowsiness?: no    Reviewed and updated as needed this visit by clinical staff   Tobacco  Allergies  Meds  Problems  Med Hx  Surg Hx  Fam Hx          Reviewed and updated as needed this visit by Provider   Tobacco  Allergies  Meds  Problems  Med Hx  Surg Hx  Fam Hx         Social History     Tobacco Use     Smoking status: Former     Packs/day: 0.00     Years: 20.00     Pack years: 0.00     Types: Cigarettes     Quit date: 1998     Years since quittin.2     Smokeless tobacco: Never     Tobacco comments:      X 20 years/no smokers in household   Substance Use Topics     Alcohol use: Not Currently     Comment: Very little         Alcohol Use 3/17/2023   Prescreen: >3 drinks/day or >7 drinks/week? No     Do you have a current opioid prescription? No  Do you use any other controlled substances or medications that are not prescribed by a provider? None    Current providers sharing in care for this patient include:   Patient Care Team:  Dayan Bateman MD as PCP - General  Dayan Bateman MD as Assigned PCP    The following health maintenance items are reviewed in Epic and correct as of today:  Health Maintenance   Topic Date Due     COVID-19 Vaccine (4 - Booster for Moderna series) 2022     LIPID  2022     ANNUAL REVIEW OF HM ORDERS  2023     MAMMO SCREENING  2023     MEDICARE ANNUAL WELLNESS VISIT  2024     FALL RISK ASSESSMENT  2024     COLORECTAL CANCER SCREENING  07/15/2025     DTAP/TDAP/TD IMMUNIZATION (3 - Td or Tdap) 2026     ADVANCE CARE PLANNING  2028     DEXA  01/15/2035     HEPATITIS C SCREENING  Completed     PHQ-2 (once per calendar year)  Completed     INFLUENZA VACCINE  Completed     Pneumococcal Vaccine: 65+ Years  Completed     ZOSTER IMMUNIZATION  Completed     IPV IMMUNIZATION  Aged Out     MENINGITIS IMMUNIZATION  Aged Out         FHS-7:   Breast CA Risk Assessment  (FHS-7) 2/8/2022 3/31/2022 3/17/2023   Did any of your first-degree relatives have breast or ovarian cancer? Yes No No   Did any of your relatives have bilateral breast cancer? Unknown No No   Did any man in your family have breast cancer? No No No   Did any woman in your family have breast and ovarian cancer? Yes No No   Did any woman in your family have breast cancer before age 50 y? Yes Yes Yes   Do you have 2 or more relatives with breast and/or ovarian cancer? Unknown No Unknown   Do you have 2 or more relatives with breast and/or bowel cancer? Unknown No Unknown       Mammogram Screening: Recommended mammography every 1-2 years with patient discussion and risk factor consideration  Pertinent mammograms are reviewed under the imaging tab.    Review of Systems   Constitutional: Negative for chills and fever.   HENT: Negative for congestion, ear pain, hearing loss and sore throat.    Eyes: Negative for pain and visual disturbance.   Respiratory: Negative for cough and shortness of breath.    Cardiovascular: Negative for chest pain, palpitations and peripheral edema.   Gastrointestinal: Negative for abdominal pain, constipation, diarrhea, heartburn, hematochezia and nausea.   Breasts:  Negative for tenderness, breast mass and discharge.   Genitourinary: Positive for genital sores (nontender, pimple like lesion). Negative for dysuria, frequency, hematuria, pelvic pain, urgency, vaginal bleeding and vaginal discharge.   Musculoskeletal: Positive for arthralgias. Negative for joint swelling and myalgias.   Skin: Negative for rash.   Neurological: Negative for dizziness, weakness, headaches and paresthesias.   Psychiatric/Behavioral: Negative for mood changes. The patient is not nervous/anxious.      Slipped and fell about 3-4 weeks ago, feels she jammed her right wrist.  Has a pull in her right wrist.  Had a tiny bit of swelling, no bruising.  Does ache.  Was wearing a brace for awhile, saw  "chiropractor.    OBJECTIVE:   /78 (Patient Position: Sitting, Cuff Size: Adult Regular)   Pulse 76   Temp 98.3  F (36.8  C) (Temporal)   Resp 14   Ht 1.545 m (5' 0.83\")   Wt 63.3 kg (139 lb 8 oz)   LMP 04/01/2006   SpO2 99%   BMI 26.51 kg/m   Estimated body mass index is 26.51 kg/m  as calculated from the following:    Height as of this encounter: 1.545 m (5' 0.83\").    Weight as of this encounter: 63.3 kg (139 lb 8 oz).  Physical Exam  Constitutional:       General: She is not in acute distress.     Appearance: She is well-developed.   HENT:      Right Ear: Tympanic membrane and external ear normal.      Left Ear: Tympanic membrane and external ear normal.      Nose: Nose normal.   Eyes:      General:         Right eye: No discharge.         Left eye: No discharge.      Conjunctiva/sclera: Conjunctivae normal.      Pupils: Pupils are equal, round, and reactive to light.   Neck:      Thyroid: No thyroid mass.   Cardiovascular:      Rate and Rhythm: Normal rate and regular rhythm.      Heart sounds: Normal heart sounds, S1 normal and S2 normal. No murmur heard.  Pulmonary:      Effort: Pulmonary effort is normal. No respiratory distress.      Breath sounds: Normal breath sounds. No wheezing or rales.   Abdominal:      General: Bowel sounds are normal.      Palpations: Abdomen is soft. There is no mass.      Tenderness: There is no abdominal tenderness.   Musculoskeletal:         General: Normal range of motion.      Cervical back: Neck supple.   Lymphadenopathy:      Cervical: No cervical adenopathy.   Skin:     General: Skin is warm and dry.      Findings: No rash.   Neurological:      Mental Status: She is alert and oriented to person, place, and time.   Psychiatric:         Mood and Affect: Mood normal.         Behavior: Behavior normal.         Thought Content: Thought content normal.         Judgment: Judgment normal.         ASSESSMENT / PLAN:   (Z00.00) Encounter for Medicare annual wellness exam "  (primary encounter diagnosis)  Plan: Lipid panel reflex to direct LDL Non-fasting,         PRIMARY CARE FOLLOW-UP SCHEDULING, Glucose          (M25.531) Right wrist pain  Comment: Patient continues to have pain in her wrist 1 month after jamming it.  Will obtain x-ray.  Plan: XR Wrist Right G/E 3 Views          (Z12.31) Visit for screening mammogram  Plan: *MA Screening Digital Bilateral            (Z78.0) Menopause  Plan: DX Hip/Pelvis/Spine          (Z23) High priority for 2019-nCoV vaccine  Plan: COVID-19,PF,MODERNA BIVALENT 18+Yrs                COUNSELING:  Reviewed preventive health counseling, as reflected in patient instructions        She reports that she quit smoking about 25 years ago. Her smoking use included cigarettes. She has never used smokeless tobacco.      Appropriate preventive services were discussed with this patient, including applicable screening as appropriate for cardiovascular disease, diabetes, osteopenia/osteoporosis, and glaucoma.  As appropriate for age/gender, discussed screening for colorectal cancer, prostate cancer, breast cancer, and cervical cancer. Checklist reviewing preventive services available has been given to the patient.    Reviewed patients plan of care and provided an AVS. The Basic Care Plan (routine screening as documented in Health Maintenance) for Enedelia meets the Care Plan requirement. This Care Plan has been established and reviewed with the Patient.      Dayan Bateman MD  [facility]    Identified Health Risks:    I have reviewed Opioid Use Disorder and Substance Use Disorder risk factors and made any needed referrals.       Information on urinary incontinence and treatment options given to patient.

## 2023-03-24 NOTE — PATIENT INSTRUCTIONS
Patient Education   Personalized Prevention Plan  You are due for the preventive services outlined below.  Your care team is available to assist you in scheduling these services.  If you have already completed any of these items, please share that information with your care team to update in your medical record.  Health Maintenance Due   Topic Date Due     COVID-19 Vaccine (4 - Booster for Moderna series) 01/31/2022     Cholesterol Lab  12/11/2022     ANNUAL REVIEW OF HM ORDERS  02/11/2023       Urinary Incontinence, Female (Adult)   Urinary incontinence means loss of bladder control. This problem affects many women, especially as they get older. If you have incontinence, you may be embarrassed to ask for help. But know that this problem can be treated.   Types of Incontinence  There are different types of incontinence. Two of the main types are described here. You can have more than one type.     Stress incontinence. With this type, urine leaks when pressure (stress) is put on the bladder. This may happen when you cough, sneeze, or laugh. Stress incontinence most often occurs because the pelvic floor muscles that support the bladder and urethra are weak. This can happen after pregnancy and vaginal childbirth or a hysterectomy. It can also be due to excess body weight or hormone changes.    Urge incontinence (also called overactive bladder). With this type, a sudden urge to urinate is felt often. This may happen even though there may not be much urine in the bladder. The need to urinate often during the night is common. Urge incontinence most often occurs because of bladder spasms. This may be due to bladder irritation or infection. Damage to bladder nerves or pelvic muscles, constipation, and certain medicines can also lead to urge incontinence.  Treatment depends on the cause. Further evaluation is needed to find the type you have. This will likely include an exam and certain tests. Based on the results, you and  your healthcare provider can then plan treatment. Until a diagnosis is made, the home care tips below can help ease symptoms.   Home care    Do pelvic floor muscle exercises, if they are prescribed. The pelvic floor muscles help support the bladder and urethra. Many women find that their symptoms improve when doing special exercises that strengthen these muscles. To do the exercises, contract the muscles you would use to stop your stream of urine. But do this when you re not urinating. Hold for 10 seconds, then relax. Repeat 10 to 20 times in a row, at least 3 times a day. Your healthcare provider may give you other instructions for how to do the exercises and how often.    Keep a bladder diary. This helps track how often and how much you urinate over a set period of time. Bring this diary with you to your next visit with the provider. The information can help your provider learn more about your bladder problem.    Lose weight, if advised to by your provider. Extra weight puts pressure on the bladder. Your provider can help you create a weight-loss plan that s right for you. This may include exercising more and making certain diet changes.    Don't have foods and drinks that may irritate the bladder. These can include alcohol and caffeinated drinks.    Quit smoking. Smoking and other tobacco use can lead to a long-term (chronic) cough that strains the pelvic floor muscles. Smoking may also damage the bladder and urethra. Talk with your provider about treatments or methods you can use to quit smoking.    If drinking large amounts of fluid makes you have symptoms, you may be advised to limit your fluid intake. You may also be advised to drink most of your fluids during the day and to limit fluids at night.    If you re worried about urine leakage or accidents, you may wear absorbent pads to catch urine. Change the pads often. This helps reduce discomfort. It may also reduce the risk of skin or bladder  infections.    Follow-up care  Follow up with your healthcare provider, or as directed. It may take some to find the right treatment for your problem. But healthy lifestyle changes can be made right away. These include such things as exercising on a regular basis, eating a healthy diet, losing weight (if needed), and quitting smoking. Your treatment plan may include special therapies or medicines. Certain procedures or surgery may also be options. Talk about any questions you have with your provider.   When to seek medical advice  Call the healthcare provider right away if any of these occur:    Fever of 100.4 F (38 C) or higher, or as directed by your provider    Bladder pain or fullness    Belly swelling    Nausea or vomiting    Back pain    Weakness, dizziness, or fainting  Suzanne last reviewed this educational content on 1/1/2020 2000-2022 [employer]. All rights reserved. This information is not intended as a substitute for professional medical care. Always follow your healthcare professional's instructions.

## 2023-04-10 ENCOUNTER — HOSPITAL ENCOUNTER (OUTPATIENT)
Dept: BONE DENSITY | Facility: CLINIC | Age: 68
Discharge: HOME | End: 2023-04-10
Attending: FAMILY MEDICINE
Payer: COMMERCIAL

## 2023-04-10 ENCOUNTER — HOSPITAL ENCOUNTER (OUTPATIENT)
Dept: MAMMOGRAPHY | Facility: CLINIC | Age: 68
Discharge: HOME | End: 2023-04-10
Attending: FAMILY MEDICINE
Payer: COMMERCIAL

## 2023-04-10 DIAGNOSIS — Z12.31 VISIT FOR SCREENING MAMMOGRAM: ICD-10-CM

## 2023-04-10 DIAGNOSIS — Z78.0 MENOPAUSE: ICD-10-CM

## 2023-04-10 PROCEDURE — 77067 SCR MAMMO BI INCL CAD: CPT

## 2023-04-10 PROCEDURE — 77080 DXA BONE DENSITY AXIAL: CPT

## 2023-04-17 ENCOUNTER — OFFICE VISIT (OUTPATIENT)
Dept: ORTHOPEDICS | Facility: CLINIC | Age: 68
End: 2023-04-17
Attending: FAMILY MEDICINE
Payer: COMMERCIAL

## 2023-04-17 VITALS
BODY MASS INDEX: 26.43 KG/M2 | HEIGHT: 61 IN | DIASTOLIC BLOOD PRESSURE: 60 MMHG | WEIGHT: 140 LBS | SYSTOLIC BLOOD PRESSURE: 110 MMHG

## 2023-04-17 DIAGNOSIS — M19.031 PRIMARY OSTEOARTHRITIS OF RIGHT WRIST: Primary | ICD-10-CM

## 2023-04-17 PROCEDURE — 99204 OFFICE O/P NEW MOD 45 MIN: CPT | Performed by: PHYSICIAN ASSISTANT

## 2023-04-17 ASSESSMENT — PAIN SCALES - GENERAL: PAINLEVEL: SEVERE PAIN (6)

## 2023-04-17 NOTE — LETTER
4/17/2023         RE: Enedelia Abraham  [address]  [facility] [address]        Dear Colleague,    Thank you for referring your patient, Enedelia Abraham, to the [facility]. Please see a copy of my visit note below.    ORTHOPEDIC CONSULT      Chief Complaint: Enedelia Abraham is a 67 year old right hand dominant female who is retired and enjoys gardening.    She is being seen for   Chief Complaints and History of Present Illnesses   Patient presents with     Musculoskeletal Problem     Right wrist pain     Consult     Ref; Dr. Bateman         History of Present Illness:   Mechanism of Injury: Fell playing pickle ball about 2 and half months ago  Location: Right base of thumb radial wrist  Duration of Pain: 2 and half months approximately  Rating of Pain: 6 out of 10  Pain Quality: Achy  Pain is better with: Rest  Pain is worse with: Activity and gripping  Treatment so far consists of: Compression glove which has helped slightly, brace for wrist but not thumb spica which has helped with playing pickle ball, chiropractic, ice and heat which has helped, Tylenol which has helped, ibuprofen 800 mg twice a day which is helped slightly, Felicitas topical which is helped.  Trying to get red light therapy also but has not done this yet.  She has not had any injections or formal therapy..   Associated Features: Denies numbness or tingling shooting burning electric pain.  Prior history of related problems: No previous major injury trauma or surgery to the right wrist  Pain is Limiting: Heavy use with the right wrist  Here to: Orthopedic consultation  The Pain Has: Slightly better now  Additional History: Patient is very active and does not slow down much.  She has been continuing to play pickle ball although it does not bother her and has recently.  She does pickleball with her brace on.  Patient's pain is waking her at night.  Patient knows that increased activity causes more pain.      Patient's  past medical, surgical, social and family histories reviewed.     Past Medical History:   Diagnosis Date     Backache, unspecified 2000    fracture of L5     Excessive or frequent menstruation 1997    Menorrhagia 1997 and 3/1997     Pilonidal cyst without mention of abscess     Surgery to remove     Unspecified multiple gestation, delivered     , Vag        Past Surgical History:   Procedure Laterality Date     ABDOMEN SURGERY       CATARACT IOL, RT/LT Bilateral 10/8/18 and 10/28/18    Cataract IOL RT/LT     COLONOSCOPY N/A 07/15/2015    Procedure: COLONOSCOPY;  Surgeon: Frederick Flores MD;  Location:  GI     DRAIN PILONIDAL CYST SIMPL      Pilonidal cyst surgery     ESOPHAGOSCOPY, GASTROSCOPY, DUODENOSCOPY (EGD), COMBINED  2013    Procedure: COMBINED ESOPHAGOSCOPY, GASTROSCOPY, DUODENOSCOPY (EGD), BIOPSY SINGLE OR MULTIPLE;  ESOPHAGOSCOPY, GASTROSCOPY, DUODENOSCOPY (EGD) with Multiple Biopsy's;  Surgeon: Frederick Dixon MD;  Location:  GI     HC DILATION/CURETTAGE DIAG/THER NON OB      D & C     [facility] LUMBAR SPINE FUSION,ANTER APPRCH  2001    posterio of L5, S1     [facility] COLONOSCOPY W BIOPSY  2005       Medications:  Ascorbic Acid (VITAMIN C PO), Take 1,000 mg by mouth  ASPIRIN 81 MG OR TABS, ONE DAILY  calcium carbonate (OS-ANDRÉS) 1500 (600 Ca) MG tablet, Take by mouth 2 times daily (with meals)  Chondroitin Sulfate A (CHONDROITIN SULFATE PO),   CINNAMON PO,   COCONUT OIL PO,   ELDERBERRY PO, Take 1,250 mg by mouth  FLAX SEED OIL 1000 MG OR CAPS, and 700mg omeg 3/one twice daily  MAGNESIUM 250 MG OR TABS, 1,000mg  MULTI-DAY VITAMINS OR, 1 tablet daily  NEW MED, [place] 250mg 2 tabs  Omega-3 Fatty Acids (FISH OIL PO), 3 per day  VITAMIN D 1000 UNIT OR CAPS, Take 5,000 Units by mouth   vitamin E (TOCOPHEROL) 400 units (180 mg) capsule, Take 400 Units by mouth  zinc gluconate 50 MG tablet, Take 50 mg by mouth daily Together 100 mg daily    No current facility-administered  "medications on file prior to visit.      Allergies   Allergen Reactions     Bupropion Hcl Hives     Wellbutrin     Clindamycin Diarrhea     Several Loose stools/cramping/rectal pressure       Social History     Occupational History     Occupation:    Tobacco Use     Smoking status: Former     Packs/day: 0.00     Years: 20.00     Pack years: 0.00     Types: Cigarettes     Quit date: 1998     Years since quittin.3     Smokeless tobacco: Never     Tobacco comments:      X 20 years/no smokers in household   Vaping Use     Vaping status: Never Used   Substance and Sexual Activity     Alcohol use: Not Currently     Comment: Very little     Drug use: No     Sexual activity: Not Currently     Partners: Male     Birth control/protection: None     Comment: vasectomy       Family History   Problem Relation Age of Onset     Breast Cancer Other         maternal neice     Diabetes Mother         diet controlled     Lipids Mother      Alcohol/Drug Father      Cancer Maternal Grandmother         breast     Cancer Maternal Grandfather         leukemia     Alcohol/Drug Brother      Alcohol/Drug Brother      Alcohol/Drug Brother      Alcohol/Drug Brother      Diabetes Brother         insulin       REVIEW OF SYSTEMS  10 point review systems performed otherwise negative as noted as per history of present illness.    Physical Exam:  Vitals: /60   Ht 1.545 m (5' 0.83\")   Wt 63.5 kg (140 lb)   LMP 2006   BMI 26.60 kg/m    BMI= Body mass index is 26.6 kg/m .    Constitutional: healthy, alert and no acute distress   Psychiatric: mentation appears normal and affect normal/bright  NEURO: no focal deficits, CMS intact Right upper extremity   RESP: Normal with easy respirations and no use of accessory muscles to breathe, no audible wheezing or retractions  CV: +2 radial pulse and her hand is warm to palpation.   SKIN: No erythema, rashes, excoriation, or breakdown. No evidence of infection. "   MUSCULOSKELETAL:    INSPECTION of right wrist: No gross deformities, erythema, edema, ecchymosis, atrophy or fasciculations.     PALPATION: No tenderness distal radius or distal ulna, no tenderness to palpation at the CMC joint, no tenderness to palpation of the distal ulna over the ulnar styloid.  No tenderness to palpation of the scaphoid.  No tenderness to palpation of the hand or digits or forearm.  No increased warmth.     ROM: flexion 85, extension 45, supination 90, pronation 90. The range of motion is without catching locking or pain.      STRENGTH: 5 out of 5 wrist flexion and extension, as well as supination and pronation with without pain.    SPECIAL TEST: Negative finklestein's test, negative CMC grind, negative snuff box tenderness, negative fovea test, DRUJ stable  GAIT: non-antalgic  Lymph: no palpable lymph nodes    Diagnostic Modalities:  Recent Results (from the past 744 hour(s))   XR Wrist Right G/E 3 Views    Narrative    WRIST RIGHT THREE OR MORE VIEWS    3/24/2023 9:53 AM     HISTORY: Fell and jammed right wrist one month ago, still having pain.  Right wrist pain.    COMPARISON: None.      FINDINGS: Well-corticated ossification near the ulnar carpal joint  could represent chronic fracture fragment or accessory ossicle. The  ulnar styloid process, however, appears intact. There is mild joint  space loss of the STT joint with mild irregularity of the scaphoid.  Remaining joint spaces are grossly well-maintained. Small ossification  along lateral aspect of the wrist near the hamate is of uncertain  clinical significance and etiology. Remaining joint spaces are  well-maintained. Pronator quadratus fat pad is of normal morphology.      Impression    IMPRESSION:    1. Moderate degenerative changes of the STT joint.  2. Well-corticated ossification at the ulnar carpal joint likely  represents an accessory ossicle or chronic fracture fragment. This  does not represent a subacute or acute fracture  fragment.  3. Small ossification along the ulnar aspect of the distal wrist is of  uncertain clinical significance and etiology. No acute osseous  fracture or malalignment is identified.    BENEDICTO ORR MD         SYSTEM ID:  [id number]                                           I agree with the above reading.  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    Independent visualization of the images was performed.    Impression: 1.  Right wrist osteoarthritis, scaphoid trapezoid trapezoidal, STT, moderate arthritis.    Plan:  All of the above pertinent physical exam and imaging modalities findings was reviewed with Enedelia.    FOCUSED PLAN:  67-year-old female with right wrist osteoarthritis on x-ray of the STT joint.  I do feel the patient has some CMC arthritis also but has a negative CMC grind today.  Change the patient's standard wrist brace to a thumb spica brace today.  She took 2, 1 so she can garden with.  We will adjunct on Voltaren gel topical.  We put in an order for hand therapy to work on ultrasound as well as range of motion and strengthening in the area.  Patient can continue with her oral anti-inflammatory ibuprofen 800 mg twice a day as needed.  Patient can continue with Felicitas gel and compression glove.  We did discuss STT wrist ultrasound-guided injection of steroid by Dr. Hussein however we will hold off on that for now.  Follow-up on an as-needed basis.    Re-x-ray on return: No      This note was dictated with Shahiya.    Mani Sorenson PA-C        Again, thank you for allowing me to participate in the care of your patient.        Sincerely,        Mani Sorenson PA-C

## 2023-04-17 NOTE — PROGRESS NOTES
ORTHOPEDIC CONSULT      Chief Complaint: Enedelia Abraham is a 67 year old right hand dominant female who is retired and enjoys gardening.    She is being seen for   Chief Complaints and History of Present Illnesses   Patient presents with     Musculoskeletal Problem     Right wrist pain     Consult     Ref; Dr. Bateman         History of Present Illness:   Mechanism of Injury: Fell playing pickle ball about 2 and half months ago  Location: Right base of thumb radial wrist  Duration of Pain: 2 and half months approximately  Rating of Pain: 6 out of 10  Pain Quality: Achy  Pain is better with: Rest  Pain is worse with: Activity and gripping  Treatment so far consists of: Compression glove which has helped slightly, brace for wrist but not thumb spica which has helped with playing pickle ball, chiropractic, ice and heat which has helped, Tylenol which has helped, ibuprofen 800 mg twice a day which is helped slightly, Felicitas topical which is helped.  Trying to get red light therapy also but has not done this yet.  She has not had any injections or formal therapy..   Associated Features: Denies numbness or tingling shooting burning electric pain.  Prior history of related problems: No previous major injury trauma or surgery to the right wrist  Pain is Limiting: Heavy use with the right wrist  Here to: Orthopedic consultation  The Pain Has: Slightly better now  Additional History: Patient is very active and does not slow down much.  She has been continuing to play pickle ball although it does not bother her and has recently.  She does pickleball with her brace on.  Patient's pain is waking her at night.  Patient knows that increased activity causes more pain.      Patient's past medical, surgical, social and family histories reviewed.     Past Medical History:   Diagnosis Date     Backache, unspecified 12/01/2000    fracture of L5     Excessive or frequent menstruation 01/01/1997    Menorrhagia 1/1997 and 3/1997      Pilonidal cyst without mention of abscess     Surgery to remove     Unspecified multiple gestation, delivered     , Vag        Past Surgical History:   Procedure Laterality Date     ABDOMEN SURGERY       CATARACT IOL, RT/LT Bilateral 10/8/18 and 10/28/18    Cataract IOL RT/LT     COLONOSCOPY N/A 07/15/2015    Procedure: COLONOSCOPY;  Surgeon: Frederick Flores MD;  Location: [facility]     DRAIN PILONIDAL CYST SIMPL      Pilonidal cyst surgery     ESOPHAGOSCOPY, GASTROSCOPY, DUODENOSCOPY (EGD), COMBINED  2013    Procedure: COMBINED ESOPHAGOSCOPY, GASTROSCOPY, DUODENOSCOPY (EGD), BIOPSY SINGLE OR MULTIPLE;  ESOPHAGOSCOPY, GASTROSCOPY, DUODENOSCOPY (EGD) with Multiple Biopsy's;  Surgeon: Frederick Dixon MD;  Location: [facility]     HC DILATION/CURETTAGE DIAG/THER NON OB      D & C     [facility] LUMBAR SPINE FUSION,ANTER APPRCH  2001    posterio of L5, S1     [facility] COLONOSCOPY W BIOPSY  2005       Medications:  Ascorbic Acid (VITAMIN C PO), Take 1,000 mg by mouth  ASPIRIN 81 MG OR TABS, ONE DAILY  calcium carbonate (OS-ANDRÉS) 1500 (600 Ca) MG tablet, Take by mouth 2 times daily (with meals)  Chondroitin Sulfate A (CHONDROITIN SULFATE PO),   CINNAMON PO,   COCONUT OIL PO,   ELDERBERRY PO, Take 1,250 mg by mouth  FLAX SEED OIL 1000 MG OR CAPS, and 700mg omeg 3/one twice daily  MAGNESIUM 250 MG OR TABS, 1,000mg  MULTI-DAY VITAMINS OR, 1 tablet daily  NEW MED, [place] 250mg 2 tabs  Omega-3 Fatty Acids (FISH OIL PO), 3 per day  VITAMIN D 1000 UNIT OR CAPS, Take 5,000 Units by mouth   vitamin E (TOCOPHEROL) 400 units (180 mg) capsule, Take 400 Units by mouth  zinc gluconate 50 MG tablet, Take 50 mg by mouth daily Together 100 mg daily    No current facility-administered medications on file prior to visit.      Allergies   Allergen Reactions     Bupropion Hcl Hives     Wellbutrin     Clindamycin Diarrhea     Several Loose stools/cramping/rectal pressure       Social History     Occupational History      "Occupation:    Tobacco Use     Smoking status: Former     Packs/day: 0.00     Years: 20.00     Pack years: 0.00     Types: Cigarettes     Quit date: 1998     Years since quittin.3     Smokeless tobacco: Never     Tobacco comments:      X 20 years/no smokers in household   Vaping Use     Vaping status: Never Used   Substance and Sexual Activity     Alcohol use: Not Currently     Comment: Very little     Drug use: No     Sexual activity: Not Currently     Partners: Male     Birth control/protection: None     Comment: vasectomy       Family History   Problem Relation Age of Onset     Breast Cancer Other         maternal neice     Diabetes Mother         diet controlled     Lipids Mother      Alcohol/Drug Father      Cancer Maternal Grandmother         breast     Cancer Maternal Grandfather         leukemia     Alcohol/Drug Brother      Alcohol/Drug Brother      Alcohol/Drug Brother      Alcohol/Drug Brother      Diabetes Brother         insulin       REVIEW OF SYSTEMS  10 point review systems performed otherwise negative as noted as per history of present illness.    Physical Exam:  Vitals: /60   Ht 1.545 m (5' 0.83\")   Wt 63.5 kg (140 lb)   LMP 2006   BMI 26.60 kg/m    BMI= Body mass index is 26.6 kg/m .    Constitutional: healthy, alert and no acute distress   Psychiatric: mentation appears normal and affect normal/bright  NEURO: no focal deficits, CMS intact Right upper extremity   RESP: Normal with easy respirations and no use of accessory muscles to breathe, no audible wheezing or retractions  CV: +2 radial pulse and her hand is warm to palpation.   SKIN: No erythema, rashes, excoriation, or breakdown. No evidence of infection.   MUSCULOSKELETAL:    INSPECTION of right wrist: No gross deformities, erythema, edema, ecchymosis, atrophy or fasciculations.     PALPATION: No tenderness distal radius or distal ulna, no tenderness to palpation at the CMC joint, no tenderness to palpation " of the distal ulna over the ulnar styloid.  No tenderness to palpation of the scaphoid.  No tenderness to palpation of the hand or digits or forearm.  No increased warmth.     ROM: flexion 85, extension 45, supination 90, pronation 90. The range of motion is without catching locking or pain.      STRENGTH: 5 out of 5 wrist flexion and extension, as well as supination and pronation with without pain.    SPECIAL TEST: Negative finklestein's test, negative CMC grind, negative snuff box tenderness, negative fovea test, DRUJ stable  GAIT: non-antalgic  Lymph: no palpable lymph nodes    Diagnostic Modalities:  Recent Results (from the past 744 hour(s))   XR Wrist Right G/E 3 Views    Narrative    WRIST RIGHT THREE OR MORE VIEWS    3/24/2023 9:53 AM     HISTORY: Fell and jammed right wrist one month ago, still having pain.  Right wrist pain.    COMPARISON: None.      FINDINGS: Well-corticated ossification near the ulnar carpal joint  could represent chronic fracture fragment or accessory ossicle. The  ulnar styloid process, however, appears intact. There is mild joint  space loss of the STT joint with mild irregularity of the scaphoid.  Remaining joint spaces are grossly well-maintained. Small ossification  along lateral aspect of the wrist near the hamate is of uncertain  clinical significance and etiology. Remaining joint spaces are  well-maintained. Pronator quadratus fat pad is of normal morphology.      Impression    IMPRESSION:    1. Moderate degenerative changes of the STT joint.  2. Well-corticated ossification at the ulnar carpal joint likely  represents an accessory ossicle or chronic fracture fragment. This  does not represent a subacute or acute fracture fragment.  3. Small ossification along the ulnar aspect of the distal wrist is of  uncertain clinical significance and etiology. No acute osseous  fracture or malalignment is identified.    BENEDICTO ORR MD         SYSTEM ID:  [id number]                                            I agree with the above reading.  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    Independent visualization of the images was performed.    Impression: 1.  Right wrist osteoarthritis, scaphoid trapezoid trapezoidal, STT, moderate arthritis.    Plan:  All of the above pertinent physical exam and imaging modalities findings was reviewed with Enedelia.    FOCUSED PLAN:  67-year-old female with right wrist osteoarthritis on x-ray of the STT joint.  I do feel the patient has some CMC arthritis also but has a negative CMC grind today.  Change the patient's standard wrist brace to a thumb spica brace today.  She took 2, 1 so she can garden with.  We will adjunct on Voltaren gel topical.  We put in an order for hand therapy to work on ultrasound as well as range of motion and strengthening in the area.  Patient can continue with her oral anti-inflammatory ibuprofen 800 mg twice a day as needed.  Patient can continue with Felicitas gel and compression glove.  We did discuss STT wrist ultrasound-guided injection of steroid by Dr. Hussein however we will hold off on that for now.  Follow-up on an as-needed basis.    Re-x-ray on return: No      This note was dictated with Teravac.    Mani Sorenson PA-C

## 2023-04-24 ENCOUNTER — TRANSFERRED RECORDS (OUTPATIENT)
Dept: HEALTH INFORMATION MANAGEMENT | Facility: CLINIC | Age: 68
End: 2023-04-24
Payer: COMMERCIAL

## 2023-06-14 ENCOUNTER — TELEPHONE (OUTPATIENT)
Dept: FAMILY MEDICINE | Facility: OTHER | Age: 68
End: 2023-06-14
Payer: COMMERCIAL

## 2023-06-14 NOTE — TELEPHONE ENCOUNTER
INCOMING FORMS    Sender: [employer]    Type of Form, letter or note (What is requested?): Progress notes, provider signature requested    How was the form received?: Fax    How should forms be returned?:  Fax : [phone].    Form placed in TC bin for review/signature if appropriate.

## 2023-06-16 NOTE — TELEPHONE ENCOUNTER
Signed forms faxed back to [facility] at [phone].    Forms placed in TC bin for scanning.

## 2023-09-20 ENCOUNTER — TRANSFERRED RECORDS (OUTPATIENT)
Dept: HEALTH INFORMATION MANAGEMENT | Facility: CLINIC | Age: 68
End: 2023-09-20
Payer: COMMERCIAL

## 2023-09-27 ENCOUNTER — TELEPHONE (OUTPATIENT)
Dept: ORTHOPEDICS | Facility: CLINIC | Age: 68
End: 2023-09-27
Payer: COMMERCIAL

## 2023-09-27 NOTE — TELEPHONE ENCOUNTER
Reason for Call:  Form, our goal is to have forms completed with 72 hours, however, some forms may require a visit or additional information.    Type of letter, form or note:   Plan of care for therapy    Who is the form from?:  [facility] (if other please explain)    Where did the form come from: form was faxed in    What clinic location was the form placed at?: [facility]    Where the form was placed:  Mani Sorenson PA-C  Box/Folder    What number is listed as a contact on the form?: [phone]       Additional comments: Please review, sign, and fax back to [phone]    Call taken on 9/27/2023 at 4:40 PM by Cat Wilde

## 2023-10-10 NOTE — TELEPHONE ENCOUNTER
Order has been signed and sent back to facility.     Yue Tobias RN   [facility]

## 2023-11-07 ENCOUNTER — TRANSFERRED RECORDS (OUTPATIENT)
Dept: HEALTH INFORMATION MANAGEMENT | Facility: CLINIC | Age: 68
End: 2023-11-07
Payer: COMMERCIAL

## 2024-02-23 ENCOUNTER — PATIENT OUTREACH (OUTPATIENT)
Dept: CARE COORDINATION | Facility: CLINIC | Age: 69
End: 2024-02-23
Payer: COMMERCIAL

## 2024-03-08 ENCOUNTER — PATIENT OUTREACH (OUTPATIENT)
Dept: CARE COORDINATION | Facility: CLINIC | Age: 69
End: 2024-03-08
Payer: COMMERCIAL

## 2024-03-11 ENCOUNTER — PATIENT OUTREACH (OUTPATIENT)
Dept: CARE COORDINATION | Facility: CLINIC | Age: 69
End: 2024-03-11
Payer: COMMERCIAL

## 2024-05-22 SDOH — HEALTH STABILITY: PHYSICAL HEALTH: ON AVERAGE, HOW MANY DAYS PER WEEK DO YOU ENGAGE IN MODERATE TO STRENUOUS EXERCISE (LIKE A BRISK WALK)?: 5 DAYS

## 2024-05-22 SDOH — HEALTH STABILITY: PHYSICAL HEALTH: ON AVERAGE, HOW MANY MINUTES DO YOU ENGAGE IN EXERCISE AT THIS LEVEL?: 60 MIN

## 2024-05-22 ASSESSMENT — SOCIAL DETERMINANTS OF HEALTH (SDOH): HOW OFTEN DO YOU GET TOGETHER WITH FRIENDS OR RELATIVES?: ONCE A WEEK

## 2024-05-23 ENCOUNTER — ANCILLARY PROCEDURE (OUTPATIENT)
Dept: MAMMOGRAPHY | Facility: OTHER | Age: 69
End: 2024-05-23
Attending: FAMILY MEDICINE
Payer: COMMERCIAL

## 2024-05-23 ENCOUNTER — OFFICE VISIT (OUTPATIENT)
Dept: FAMILY MEDICINE | Facility: OTHER | Age: 69
End: 2024-05-23
Payer: COMMERCIAL

## 2024-05-23 VITALS
SYSTOLIC BLOOD PRESSURE: 100 MMHG | HEART RATE: 75 BPM | HEIGHT: 61 IN | BODY MASS INDEX: 23.41 KG/M2 | DIASTOLIC BLOOD PRESSURE: 70 MMHG | TEMPERATURE: 97.8 F | WEIGHT: 124 LBS | OXYGEN SATURATION: 99 %

## 2024-05-23 DIAGNOSIS — Z00.00 ENCOUNTER FOR MEDICARE ANNUAL WELLNESS EXAM: Primary | ICD-10-CM

## 2024-05-23 DIAGNOSIS — E78.5 HYPERLIPIDEMIA, UNSPECIFIED HYPERLIPIDEMIA TYPE: ICD-10-CM

## 2024-05-23 DIAGNOSIS — R73.01 ELEVATED FASTING GLUCOSE: ICD-10-CM

## 2024-05-23 DIAGNOSIS — M25.562 LEFT KNEE PAIN, UNSPECIFIED CHRONICITY: ICD-10-CM

## 2024-05-23 DIAGNOSIS — Z12.31 VISIT FOR SCREENING MAMMOGRAM: ICD-10-CM

## 2024-05-23 LAB
CHOLEST SERPL-MCNC: 248 MG/DL
FASTING STATUS PATIENT QL REPORTED: YES
FASTING STATUS PATIENT QL REPORTED: YES
GLUCOSE SERPL-MCNC: 106 MG/DL (ref 70–99)
HBA1C MFR BLD: 5.6 % (ref 0–5.6)
HDLC SERPL-MCNC: 86 MG/DL
LDLC SERPL CALC-MCNC: 143 MG/DL
NONHDLC SERPL-MCNC: 162 MG/DL
TRIGL SERPL-MCNC: 95 MG/DL

## 2024-05-23 PROCEDURE — G0439 PPPS, SUBSEQ VISIT: HCPCS | Performed by: FAMILY MEDICINE

## 2024-05-23 PROCEDURE — 77063 BREAST TOMOSYNTHESIS BI: CPT | Mod: TC | Performed by: RADIOLOGY

## 2024-05-23 PROCEDURE — 82947 ASSAY GLUCOSE BLOOD QUANT: CPT | Performed by: FAMILY MEDICINE

## 2024-05-23 PROCEDURE — 83036 HEMOGLOBIN GLYCOSYLATED A1C: CPT | Performed by: FAMILY MEDICINE

## 2024-05-23 PROCEDURE — 36415 COLL VENOUS BLD VENIPUNCTURE: CPT | Performed by: FAMILY MEDICINE

## 2024-05-23 PROCEDURE — 80061 LIPID PANEL: CPT | Performed by: FAMILY MEDICINE

## 2024-05-23 PROCEDURE — 77067 SCR MAMMO BI INCL CAD: CPT | Mod: TC | Performed by: RADIOLOGY

## 2024-05-23 ASSESSMENT — PAIN SCALES - GENERAL: PAINLEVEL: NO PAIN (0)

## 2024-05-23 NOTE — PROGRESS NOTES
Preventive Care Visit  [facility]  Dayan Bateman MD, Family Medicine  May 23, 2024      Assessment & Plan     Encounter for Medicare annual wellness exam    Elevated fasting glucose  Last year she had a mildly elevated glucose.  Will recheck glucose as well as A1c this year.      - Glucose; Future  - Hemoglobin A1c; Future    Hyperlipidemia, unspecified hyperlipidemia type  She has been watching her diet and been more active and has lost weight from last year.  Will recheck lipids    - Lipid panel reflex to direct LDL Fasting; Future    Left knee pain, unspecified chronicity  About a month ago she feels that she missed stepped on her left leg.  She has been having some aching in her medial knee.  She denies any swelling, erythema, locking or giving out.  She does feel it is getting better.  She does have some Voltaren gel that she is using on her hands and discussed she can use this on her knee.  If it does not continue to improve, she will then contact me and we will refer to physical therapy.      Counseling  Appropriate preventive services were discussed with this patient, including applicable screening as appropriate for fall prevention, nutrition, physical activity, Tobacco-use cessation, weight loss and cognition.  Checklist reviewing preventive services available has been given to the patient.  Reviewed patient's diet, addressing concerns and/or questions.   Discussed possible causes of fatigue. The patient was provided with written information regarding signs of hearing loss.       Kathleen Mcdaniels is a 68 year old, presenting for the following:  Medicare Visit        5/23/2024     9:18 AM   Additional Questions   Roomed by fidel         Health Care Directive  Patient has a Health Care Directive on file  Advance care planning document is on file and is current.    HPI        5/22/2024   General Health   How would you rate your overall physical health? Good   Feel stress  (tense, anxious, or unable to sleep) To some extent   (!) STRESS CONCERN      5/22/2024   Nutrition   Diet: Regular (no restrictions)         5/22/2024   Exercise   Days per week of moderate/strenous exercise 5 days   Average minutes spent exercising at this level 60 min         5/22/2024   Social Factors   Frequency of gathering with friends or relatives Once a week   Worry food won't last until get money to buy more No   Food not last or not have enough money for food? No   Do you have housing?  Yes   Are you worried about losing your housing? No   Lack of transportation? No   Unable to get utilities (heat,electricity)? No         5/22/2024   Fall Risk   Fallen 2 or more times in the past year? Yes    No   Trouble with walking or balance? No    No           5/22/2024   Activities of Daily Living- Home Safety   Needs help with the following daily activites None of the above   Safety concerns in the home None of the above         5/22/2024   Dental   Dentist two times every year? Yes         5/22/2024   Hearing Screening   Hearing concerns? (!) IT'S HARD TO FOLLOW A CONVERSATION IN A NOISY RESTAURANT OR CROWDED ROOM.    (!) TROUBLE UNDERSTANDING SOFT OR WHISPERED SPEECH.         5/22/2024   Driving Risk Screening   Patient/family members have concerns about driving No         5/22/2024   General Alertness/Fatigue Screening   Have you been more tired than usual lately? (!) YES         5/22/2024   Urinary Incontinence Screening   Bothered by leaking urine in past 6 months No         5/22/2024   TB Screening   Were you born outside of the [place]? No         Today's PHQ-2 Score:       5/22/2024     4:06 PM   PHQ-2 ( 1999 [employer])   Q1: Little interest or pleasure in doing things 0   Q2: Feeling down, depressed or hopeless 1   PHQ-2 Score 1   Q1: Little interest or pleasure in doing things Not at all   Q2: Feeling down, depressed or hopeless Several days   PHQ-2 Score 1           5/22/2024   Substance Use   Alcohol more than  3/day or more than 7/wk No   Do you have a current opioid prescription? No   How severe/bad is pain from 1 to 10? 5/10   Do you use any other substances recreationally? No     Social History     Tobacco Use    Smoking status: Former     Current packs/day: 0.00     Types: Cigarettes     Quit date: 1978     Years since quittin.4    Smokeless tobacco: Never    Tobacco comments:      X 20 years/no smokers in household   Vaping Use    Vaping status: Never Used   Substance Use Topics    Alcohol use: Not Currently     Comment: Very little    Drug use: No           4/10/2023   LAST FHS-7 RESULTS   1st degree relative breast or ovarian cancer No   Any relative bilateral breast cancer No   Any male have breast cancer No   Any ONE woman have BOTH breast AND ovarian cancer No   Any woman with breast cancer before 50yrs Yes   2 or more relatives with breast AND/OR ovarian cancer No   2 or more relatives with breast AND/OR bowel cancer No        Mammogram Screening - Mammogram every 1-2 years updated in Health Maintenance based on mutual decision making          Latest Ref Rng & Units 2018    10:51 AM 2018    10:50 AM 2015    10:00 AM   PAP / HPV   PAP (Historical)  NIL   NIL    HPV 16 DNA NEG^Negative  Negative  Negative    HPV 18 DNA NEG^Negative  Negative  Negative    Other HR HPV NEG^Negative  Negative  Negative      ASCVD Risk   The 10-year ASCVD risk score (Heather RODRIGUEZ, et al., 2019) is: 5.1%    Values used to calculate the score:      Age: 68 years      Sex: Female      Is Non- : No      Diabetic: No      Tobacco smoker: No      Systolic Blood Pressure: 100 mmHg      Is BP treated: No      HDL Cholesterol: 80 mg/dL      Total Cholesterol: 227 mg/dL          Reviewed and updated as needed this visit by Provider   Tobacco  Allergies  Meds  Problems  Med Hx  Surg Hx  Fam Hx            Current providers sharing in care for this patient include:  Patient Care  "Team:  Dayan Bateman MD as PCP - General  Dayan Bateman MD as Assigned PCP  Mani Sorenson PA-C as Assigned Musculoskeletal Provider    The following health maintenance items are reviewed in Epic and correct as of today:  Health Maintenance   Topic Date Due    COVID-19 Vaccine (6 - 2023-24 season) 02/29/2024    ANNUAL REVIEW OF HM ORDERS  03/24/2024    MAMMO SCREENING  04/10/2024    MEDICARE ANNUAL WELLNESS VISIT  05/23/2025    FALL RISK ASSESSMENT  05/23/2025    COLORECTAL CANCER SCREENING  07/15/2025    GLUCOSE  03/24/2026    LIPID  03/24/2028    ADVANCE CARE PLANNING  03/24/2028    DTAP/TDAP/TD IMMUNIZATION (4 - Td or Tdap) 03/24/2033    DEXA  04/10/2038    HEPATITIS C SCREENING  Completed    PHQ-2 (once per calendar year)  Completed    INFLUENZA VACCINE  Completed    Pneumococcal Vaccine: 65+ Years  Completed    ZOSTER IMMUNIZATION  Completed    RSV VACCINE (Pregnancy & 60+)  Completed    IPV IMMUNIZATION  Aged Out    HPV IMMUNIZATION  Aged Out    MENINGITIS IMMUNIZATION  Aged Out    RSV MONOCLONAL ANTIBODY  Aged Out          Objective    Exam  /70 (BP Location: Right arm, Patient Position: Sitting, Cuff Size: Adult Regular)   Pulse 75   Temp 97.8  F (36.6  C) (Temporal)   Ht 1.54 m (5' 0.63\")   Wt 56.2 kg (124 lb)   LMP 04/01/2006   SpO2 99%   BMI 23.72 kg/m     Estimated body mass index is 23.72 kg/m  as calculated from the following:    Height as of this encounter: 1.54 m (5' 0.63\").    Weight as of this encounter: 56.2 kg (124 lb).    Physical Exam  Constitutional:       General: She is not in acute distress.     Appearance: She is well-developed.   HENT:      Right Ear: Tympanic membrane and external ear normal.      Left Ear: Tympanic membrane and external ear normal.      Nose: Nose normal.   Eyes:      General:         Right eye: No discharge.         Left eye: No discharge.      Conjunctiva/sclera: Conjunctivae normal.   Neck:      Thyroid: No thyroid mass. "   Cardiovascular:      Rate and Rhythm: Normal rate and regular rhythm.      Heart sounds: Normal heart sounds, S1 normal and S2 normal. No murmur heard.  Pulmonary:      Effort: Pulmonary effort is normal. No respiratory distress.      Breath sounds: Normal breath sounds. No wheezing or rales.   Abdominal:      General: Bowel sounds are normal.      Palpations: Abdomen is soft. There is no mass.      Tenderness: There is no abdominal tenderness.   Musculoskeletal:         General: Normal range of motion.      Cervical back: Neck supple.   Lymphadenopathy:      Cervical: No cervical adenopathy.   Skin:     General: Skin is warm and dry.      Findings: No rash.   Neurological:      Mental Status: She is alert and oriented to person, place, and time.   Psychiatric:         Mood and Affect: Mood normal.         Behavior: Behavior normal.         Thought Content: Thought content normal.         Judgment: Judgment normal.               5/23/2024   Mini Cog   Clock Draw Score 2 Normal   3 Item Recall 3 objects recalled   Mini Cog Total Score 5              Signed Electronically by: Dayan Bateman MD

## 2024-05-23 NOTE — PATIENT INSTRUCTIONS
"Preventive Care Advice   This is general advice we often give to help people stay healthy. Your care team may have specific advice just for you. Please talk to your care team about your own preventive care needs.  Lifestyle  Exercise at least 150 minutes each week (30 minutes a day, 5 days a week).  Do muscle strengthening activities 2 days a week. These help control your weight and prevent disease.  No smoking.  Wear sunscreen to prevent skin cancer.  Have your home tested for radon every 2 to 5 years. Radon is a colorless, odorless gas that can harm your lungs. To learn more, go to www.health.[facility]. and search for \"Radon in Homes.\"  Keep guns unloaded and locked up in a safe place like a safe or gun vault, or, use a gun lock and hide the keys. Always lock away bullets separately. To learn more, visit [place].gov and search for \"safe gun storage.\"  Nutrition  Eat 5 or more servings of fruits and vegetables each day.  Try wheat bread, brown rice and whole grain pasta (instead of white bread, rice, and pasta).  Get enough calcium and vitamin D. Check the label on foods and aim for 100% of the RDA (recommended daily allowance).  Regular exams  Have a dental exam and cleaning every 6 months.  See your health care team every year to talk about:  Any changes in your health.  Any medicines your care team has prescribed.  Preventive care, family planning, and ways to prevent chronic diseases.  Shots (vaccines)   HPV shots (up to age 26), if you've never had them before.  Hepatitis B shots (up to age 59), if you've never had them before.  COVID-19 shot: Get this shot when it's due.  Flu shot: Get a flu shot every year.  Tetanus shot: Get a tetanus shot every 10 years.  Pneumococcal, hepatitis A, and RSV shots: Ask your care team if you need these based on your risk.  Shingles shot (for age 50 and up).  General health tests  Diabetes screening:  Starting at age 35, Get screened for diabetes at least every 3 years.  If " you are younger than age 35, ask your care team if you should be screened for diabetes.  Cholesterol test: At age 39, start having a cholesterol test every 5 years, or more often if advised.  Bone density scan (DEXA): At age 50, ask your care team if you should have this scan for osteoporosis (brittle bones).  Hepatitis C: Get tested at least once in your life.  Abdominal aortic aneurysm screening: Talk to your doctor about having this screening if you:  Have ever smoked; and  Are biologically male; and  Are between the ages of 65 and 75.  STIs (sexually transmitted infections)  Before age 24: Ask your care team if you should be screened for STIs.  After age 24: Get screened for STIs if you're at risk. You are at risk for STIs (including HIV) if:  You are sexually active with more than one person.  You don't use condoms every time.  You or a partner was diagnosed with a sexually transmitted infection.  If you are at risk for HIV, ask about PrEP medicine to prevent HIV.  Get tested for HIV at least once in your life, whether you are at risk for HIV or not.  Cancer screening tests  Cervical cancer screening: If you have a cervix, begin getting regular cervical cancer screening tests at age 21. Most people who have regular screenings with normal results can stop after age 65. Talk about this with your provider.  Breast cancer scan (mammogram): If you've ever had breasts, begin having regular mammograms starting at age 40. This is a scan to check for breast cancer.  Colon cancer screening: It is important to start screening for colon cancer at age 45.  Have a colonoscopy test every 10 years (or more often if you're at risk) Or, ask your provider about stool tests like a FIT test every year or Cologuard test every 3 years.  To learn more about your testing options, visit: www.[email or web address].pdf.  For help making a decision, visit: soo/ol98229.  Prostate cancer screening test: If you have a prostate and are age 55  to 69, ask your provider if you would benefit from a yearly prostate cancer screening test.  Lung cancer screening: If you are a current or former smoker age 50 to 80, ask your care team if ongoing lung cancer screenings are right for you.  For informational purposes only. Not to replace the advice of your health care provider. Copyright   2023 [employer] [place] [employer]. All rights reserved. Clinically reviewed by the [facility]. [employer] [id number] - REV 04/24.    Preventing Falls: Care Instructions  Injuries and health problems such as trouble walking or poor eyesight can increase your risk of falling. So can some medicines. But there are things you can do to help prevent falls. You can exercise to get stronger. You can also arrange your home to make it safer.    Talk to your doctor about the medicines you take. Ask if any of them increase the risk of falls and whether they can be changed or stopped.   Try to exercise regularly. It can help improve your strength and balance. This can help lower your risk of falling.     Practice fall safety and prevention.    Wear low-heeled shoes that fit well and give your feet good support. Talk to your doctor if you have foot problems that make this hard.  Carry a cellphone or wear a medical alert device that you can use to call for help.  Use stepladders instead of chairs to reach high objects. Don't climb if you're at risk for falls. Ask for help, if needed.  Wear the correct eyeglasses, if you need them.    Make your home safer.    Remove rugs, cords, clutter, and furniture from walkways.  Keep your house well lit. Use night-lights in hallways and bathrooms.  Install and use sturdy handrails on stairways.  Wear nonskid footwear, even inside. Don't walk barefoot or in socks without shoes.    Be safe outside.    Use handrails, curb cuts, and ramps whenever possible.  Keep your hands free by using a shoulder bag or backpack.  Try to walk in well-lit  "areas. Watch out for uneven ground, changes in pavement, and debris.  Be careful in the winter. Walk on the grass or gravel when sidewalks are slippery. Use de-icer on steps and walkways. Add non-slip devices to shoes.    Put grab bars and nonskid mats in your shower or tub and near the toilet. Try to use a shower chair or bath bench when bathing.   Get into a tub or shower by putting in your weaker leg first. Get out with your strong side first. Have a phone or medical alert device in the bathroom with you.   Where can you learn more?  Go to https://www.[employer].net/patiented  Enter G117 in the search box to learn more about \"Preventing Falls: Care Instructions.\"  Current as of: July 17, 2023               Content Version: 14.0    4823-3241 [employer].   Care instructions adapted under license by your healthcare professional. If you have questions about a medical condition or this instruction, always ask your healthcare professional. [employer] disclaims any warranty or liability for your use of this information.      Hearing Loss: Care Instructions  Overview     Hearing loss is a sudden or slow decrease in how well you hear. It can range from slight to profound. Permanent hearing loss can occur with aging. It also can happen when you are exposed long-term to loud noise. Examples include listening to loud music, riding motorcycles, or being around other loud machines.  Hearing loss can affect your work and home life. It can make you feel lonely or depressed. You may feel that you have lost your independence. But hearing aids and other devices can help you hear better and feel connected to others.  Follow-up care is a key part of your treatment and safety. Be sure to make and go to all appointments, and call your doctor if you are having problems. It's also a good idea to know your test results and keep a list of the medicines you take.  How can you care for yourself at home?  Avoid " loud noises whenever possible. This helps keep your hearing from getting worse.  Always wear hearing protection around loud noises.  Wear a hearing aid as directed.  A professional can help you pick a hearing aid that will work best for you.  You can also get hearing aids over the counter for mild to moderate hearing loss.  Have hearing tests as your doctor suggests. They can show whether your hearing has changed. Your hearing aid may need to be adjusted.  Use other devices as needed. These may include:  Telephone amplifiers and hearing aids that can connect to a television, stereo, radio, or microphone.  Devices that use lights or vibrations. These alert you to the doorbell, a ringing telephone, or a baby monitor.  Television closed-captioning. This shows the words at the bottom of the screen. Most new TVs can do this.  TTY (text telephone). This lets you type messages back and forth on the telephone instead of talking or listening. These devices are also called TDD. When messages are typed on the keyboard, they are sent over the phone line to a receiving TTY. The message is shown on a monitor.  Use text messaging, social media, and email if it is hard for you to communicate by telephone.  Try to learn a listening technique called speechreading. It is not lipreading. You pay attention to people's gestures, expressions, posture, and tone of voice. These clues can help you understand what a person is saying. Face the person you are talking to, and have them face you. Make sure the lighting is good. You need to see the other person's face clearly.  Think about counseling if you need help to adjust to your hearing loss.  When should you call for help?  Watch closely for changes in your health, and be sure to contact your doctor if:    You think your hearing is getting worse.     You have new symptoms, such as dizziness or nausea.   Where can you learn more?  Go to https://www.healthwise.net/patiented  Enter R798 in the  "search box to learn more about \"Hearing Loss: Care Instructions.\"  Current as of: September 27, 2023               Content Version: 14.0    3591-9636 [employer].   Care instructions adapted under license by your healthcare professional. If you have questions about a medical condition or this instruction, always ask your healthcare professional. [employer] disclaims any warranty or liability for your use of this information.      Learning About Stress  What is stress?     Stress is your body's response to a hard situation. Your body can have a physical, emotional, or mental response. Stress is a fact of life for most people, and it affects everyone differently. What causes stress for you may not be stressful for someone else.  A lot of things can cause stress. You may feel stress when you go on a job interview, take a test, or run a race. This kind of short-term stress is normal and even useful. It can help you if you need to work hard or react quickly. For example, stress can help you finish an important job on time.  Long-term stress is caused by ongoing stressful situations or events. Examples of long-term stress include long-term health problems, ongoing problems at work, or conflicts in your family. Long-term stress can harm your health.  How does stress affect your health?  When you are stressed, your body responds as though you are in danger. It makes hormones that speed up your heart, make you breathe faster, and give you a burst of energy. This is called the fight-or-flight stress response. If the stress is over quickly, your body goes back to normal and no harm is done.  But if stress happens too often or lasts too long, it can have bad effects. Long-term stress can make you more likely to get sick, and it can make symptoms of some diseases worse. If you tense up when you are stressed, you may develop neck, shoulder, or low back pain. Stress is linked to high blood pressure and " heart disease.  Stress also harms your emotional health. It can make you fan, tense, or depressed. Your relationships may suffer, and you may not do well at work or school.  What can you do to manage stress?  You can try these things to help manage stress:   Do something active. Exercise or activity can help reduce stress. Walking is a great way to get started. Even everyday activities such as housecleaning or yard work can help.  Try yoga or eileen chi. These techniques combine exercise and meditation. You may need some training at first to learn them.  Do something you enjoy. For example, listen to music or go to a movie. Practice your hobby or do volunteer work.  Meditate. This can help you relax, because you are not worrying about what happened before or what may happen in the future.  Do guided imagery. Imagine yourself in any setting that helps you feel calm. You can use online videos, books, or a teacher to guide you.  Do breathing exercises. For example:  From a standing position, bend forward from the waist with your knees slightly bent. Let your arms dangle close to the floor.  Breathe in slowly and deeply as you return to a standing position. Roll up slowly and lift your head last.  Hold your breath for just a few seconds in the standing position.  Breathe out slowly and bend forward from the waist.  Let your feelings out. Talk, laugh, cry, and express anger when you need to. Talking with supportive friends or family, a counselor, or a rosa leader about your feelings is a healthy way to relieve stress. Avoid discussing your feelings with people who make you feel worse.  Write. It may help to write about things that are bothering you. This helps you find out how much stress you feel and what is causing it. When you know this, you can find better ways to cope.  What can you do to prevent stress?  You might try some of these things to help prevent stress:  Manage your time. This helps you find time to do the  "things you want and need to do.  Get enough sleep. Your body recovers from the stresses of the day while you are sleeping.  Get support. Your family, friends, and community can make a difference in how you experience stress.  Limit your news feed. Avoid or limit time on social media or news that may make you feel stressed.  Do something active. Exercise or activity can help reduce stress. Walking is a great way to get started.  Where can you learn more?  Go to https://www.[employer].net/patiented  Enter N032 in the search box to learn more about \"Learning About Stress.\"  Current as of: October 24, 2023               Content Version: 14.0    5842-9469 [employer].   Care instructions adapted under license by your healthcare professional. If you have questions about a medical condition or this instruction, always ask your healthcare professional. [employer] disclaims any warranty or liability for your use of this information.      Learning About Sleeping Well  What does sleeping well mean?     Sleeping well means getting enough sleep to feel good and stay healthy. How much sleep is enough varies among people.  The number of hours you sleep and how you feel when you wake up are both important. If you do not feel refreshed, you probably need more sleep. Another sign of not getting enough sleep is feeling tired during the day.  Experts recommend that adults get at least 7 or more hours of sleep per day. Children and older adults need more sleep.  Why is getting enough sleep important?  Getting enough quality sleep is a basic part of good health. When your sleep suffers, your physical health, mood, and your thoughts can suffer too. You may find yourself feeling more grumpy or stressed. Not getting enough sleep also can lead to serious problems, including injury, accidents, anxiety, and depression.  What might cause poor sleeping?  Many things can cause sleep problems, including:  Changes to your " "sleep schedule.  Stress. Stress can be caused by fear about a single event, such as giving a speech. Or you may have ongoing stress, such as worry about work or school.  Depression, anxiety, and other mental or emotional conditions.  Changes in your sleep habits or surroundings. This includes changes that happen where you sleep, such as noise, light, or sleeping in a different bed. It also includes changes in your sleep pattern, such as having jet lag or working a late shift.  Health problems, such as pain, breathing problems, and restless legs syndrome.  Lack of regular exercise.  Using alcohol, nicotine, or caffeine before bed.  How can you help yourself?  Here are some tips that may help you sleep more soundly and wake up feeling more refreshed.  Your sleeping area   Use your bedroom only for sleeping and sex. A bit of light reading may help you fall asleep. But if it doesn't, do your reading elsewhere in the house. Try not to use your TV, computer, smartphone, or tablet while you are in bed.  Be sure your bed is big enough to stretch out comfortably, especially if you have a sleep partner.  Keep your bedroom quiet, dark, and cool. Use curtains, blinds, or a sleep mask to block out light. To block out noise, use earplugs, soothing music, or a \"white noise\" machine.  Your evening and bedtime routine   Create a relaxing bedtime routine. You might want to take a warm shower or bath, or listen to soothing music.  Go to bed at the same time every night. And get up at the same time every morning, even if you feel tired.  What to avoid   Limit caffeine (coffee, tea, caffeinated sodas) during the day, and don't have any for at least 6 hours before bedtime.  Avoid drinking alcohol before bedtime. Alcohol can cause you to wake up more often during the night.  Try not to smoke or use tobacco, especially in the evening. Nicotine can keep you awake.  Limit naps during the day, especially close to bedtime.  Avoid lying in bed " "awake for too long. If you can't fall asleep or if you wake up in the middle of the night and can't get back to sleep within about 20 minutes, get out of bed and go to another room until you feel sleepy.  Avoid taking medicine right before bed that may keep you awake or make you feel hyper or energized. Your doctor can tell you if your medicine may do this and if you can take it earlier in the day.  If you can't sleep   Imagine yourself in a peaceful, pleasant scene. Focus on the details and feelings of being in a place that is relaxing.  Get up and do a quiet or boring activity until you feel sleepy.  Avoid drinking any liquids before going to bed to help prevent waking up often to use the bathroom.  Where can you learn more?  Go to https://www.[employer].net/patiented  Enter J942 in the search box to learn more about \"Learning About Sleeping Well.\"  Current as of: July 10, 2023               Content Version: 14.0    3073-1658 [employer].   Care instructions adapted under license by your healthcare professional. If you have questions about a medical condition or this instruction, always ask your healthcare professional. [employer] disclaims any warranty or liability for your use of this information.      "

## 2024-05-24 ENCOUNTER — MYC MEDICAL ADVICE (OUTPATIENT)
Dept: FAMILY MEDICINE | Facility: OTHER | Age: 69
End: 2024-05-24
Payer: COMMERCIAL

## 2024-05-28 NOTE — TELEPHONE ENCOUNTER
The 10-year ASCVD risk score (Heather RODRIGUEZ, et al., 2019) is: 5.2%    Values used to calculate the score:      Age: 68 years      Sex: Female      Is Non- : No      Diabetic: No      Tobacco smoker: No      Systolic Blood Pressure: 100 mmHg      Is BP treated: No      HDL Cholesterol: 86 mg/dL      Total Cholesterol: 248 mg/dL

## 2024-09-05 ENCOUNTER — TELEPHONE (OUTPATIENT)
Dept: FAMILY MEDICINE | Facility: OTHER | Age: 69
End: 2024-09-05
Payer: COMMERCIAL

## 2024-09-05 DIAGNOSIS — M25.562 LEFT KNEE PAIN, UNSPECIFIED CHRONICITY: Primary | ICD-10-CM

## 2024-09-05 NOTE — TELEPHONE ENCOUNTER
Order/Referral Request    Who is requesting: patient    Orders being requested: physical therapy referral for left knee    Reason service is needed/diagnosis: left knee pain - since May 2024    When are orders needed by: when available     Has this been discussed with Provider: Yes - during last visit it was discussed briefly    Does patient have a preference on a Group/Provider/Facility? [facility] location    Does patient have an appointment scheduled?: No    Where to send orders: Fax    Could we send this information to you in [employer] or would you prefer to receive a phone call?:   Patient would prefer a phone call   Okay to leave a detailed message?: Yes at Cell number on file:    Telephone Information:   Mobile [phone]

## 2024-09-12 ENCOUNTER — TRANSFERRED RECORDS (OUTPATIENT)
Dept: HEALTH INFORMATION MANAGEMENT | Facility: CLINIC | Age: 69
End: 2024-09-12
Payer: COMMERCIAL

## 2024-09-19 ENCOUNTER — TELEPHONE (OUTPATIENT)
Dept: FAMILY MEDICINE | Facility: OTHER | Age: 69
End: 2024-09-19
Payer: COMMERCIAL

## 2024-09-19 NOTE — TELEPHONE ENCOUNTER
INCOMING FORMS    Sender: [employer]    Type of Form, letter or note (What is requested?): Order    How was the form received?: Fax    How should forms be returned?:  Fax : [phone]    Form placed in OOO bin for review/signature if appropriate.

## 2024-09-30 ENCOUNTER — TRANSFERRED RECORDS (OUTPATIENT)
Dept: HEALTH INFORMATION MANAGEMENT | Facility: CLINIC | Age: 69
End: 2024-09-30

## 2024-10-02 ENCOUNTER — MYC MEDICAL ADVICE (OUTPATIENT)
Dept: FAMILY MEDICINE | Facility: OTHER | Age: 69
End: 2024-10-02
Payer: COMMERCIAL

## 2024-10-24 ENCOUNTER — TRANSFERRED RECORDS (OUTPATIENT)
Dept: HEALTH INFORMATION MANAGEMENT | Facility: CLINIC | Age: 69
End: 2024-10-24
Payer: COMMERCIAL

## 2024-11-04 ENCOUNTER — MYC MEDICAL ADVICE (OUTPATIENT)
Dept: FAMILY MEDICINE | Facility: OTHER | Age: 69
End: 2024-11-04
Payer: COMMERCIAL

## 2025-06-02 SDOH — HEALTH STABILITY: PHYSICAL HEALTH: ON AVERAGE, HOW MANY DAYS PER WEEK DO YOU ENGAGE IN MODERATE TO STRENUOUS EXERCISE (LIKE A BRISK WALK)?: 4 DAYS

## 2025-06-02 SDOH — HEALTH STABILITY: PHYSICAL HEALTH: ON AVERAGE, HOW MANY MINUTES DO YOU ENGAGE IN EXERCISE AT THIS LEVEL?: 50 MIN

## 2025-06-05 ENCOUNTER — RESULTS FOLLOW-UP (OUTPATIENT)
Dept: FAMILY MEDICINE | Facility: OTHER | Age: 70
End: 2025-06-05

## 2025-06-05 ENCOUNTER — TRANSFERRED RECORDS (OUTPATIENT)
Dept: HEALTH INFORMATION MANAGEMENT | Facility: CLINIC | Age: 70
End: 2025-06-05

## 2025-06-05 ENCOUNTER — OFFICE VISIT (OUTPATIENT)
Dept: FAMILY MEDICINE | Facility: OTHER | Age: 70
End: 2025-06-05
Payer: COMMERCIAL

## 2025-06-05 VITALS
HEART RATE: 77 BPM | WEIGHT: 130 LBS | DIASTOLIC BLOOD PRESSURE: 70 MMHG | OXYGEN SATURATION: 99 % | TEMPERATURE: 97.2 F | RESPIRATION RATE: 12 BRPM | BODY MASS INDEX: 25.52 KG/M2 | SYSTOLIC BLOOD PRESSURE: 130 MMHG | HEIGHT: 60 IN

## 2025-06-05 DIAGNOSIS — E78.5 HYPERLIPIDEMIA, UNSPECIFIED HYPERLIPIDEMIA TYPE: ICD-10-CM

## 2025-06-05 DIAGNOSIS — Z00.00 ENCOUNTER FOR MEDICARE ANNUAL WELLNESS EXAM: Primary | ICD-10-CM

## 2025-06-05 DIAGNOSIS — R73.03 PREDIABETES: Primary | ICD-10-CM

## 2025-06-05 DIAGNOSIS — R73.01 ELEVATED FASTING GLUCOSE: ICD-10-CM

## 2025-06-05 LAB
CHOLEST SERPL-MCNC: 180 MG/DL
EST. AVERAGE GLUCOSE BLD GHB EST-MCNC: 120 MG/DL
FASTING STATUS PATIENT QL REPORTED: NO
HBA1C MFR BLD: 5.8 % (ref 0–5.6)
HDLC SERPL-MCNC: 70 MG/DL
LDLC SERPL CALC-MCNC: 90 MG/DL
NONHDLC SERPL-MCNC: 110 MG/DL
TRIGL SERPL-MCNC: 99 MG/DL
TSH SERPL DL<=0.005 MIU/L-ACNC: 1.61 UIU/ML (ref 0.3–4.2)

## 2025-06-05 NOTE — PROGRESS NOTES
Preventive Care Visit  [facility]  Dayan Bateman MD, Family Medicine  Jun 5, 2025      Assessment & Plan     Encounter for Medicare annual wellness exam    Hyperlipidemia, unspecified hyperlipidemia type  Labs drawn    - Lipid panel reflex to direct LDL Non-fasting  - TSH with free T4 reflex    Elevated fasting glucose  History of elevated fasting glucose last year as well as in a few labs she had drawn through work within the last year.  Will check A1c today.    - Hemoglobin A1c        Counseling  Appropriate preventive services were addressed with this patient via screening, questionnaire, or discussion as appropriate for fall prevention, nutrition, physical activity, Tobacco-use cessation, social engagement, weight loss and cognition.  Checklist reviewing preventive services available has been given to the patient.  Reviewed patient's diet, addressing concerns and/or questions.   Discussed possible causes of fatigue. The patient was provided with written information regarding signs of hearing loss.           Kathleen Mcdaniels is a 69 year old, presenting for the following:  Medicare Visit        6/5/2025     9:40 AM   Additional Questions   Roomed by fidel MOE       Advance Care Planning    Document on file is a Health Care Directive or POLST.        6/2/2025   General Health   How would you rate your overall physical health? Good   Feel stress (tense, anxious, or unable to sleep) Only a little   (!) STRESS CONCERN      6/2/2025   Nutrition   Diet: Regular (no restrictions)    Low salt       Multiple values from one day are sorted in reverse-chronological order         6/2/2025   Exercise   Days per week of moderate/strenous exercise 4 days   Average minutes spent exercising at this level 50 min         6/2/2025   Social Factors   Worry food won't last until get money to buy more No   Food not last or not have enough money for food? No   Do you have housing? (Housing  is defined as stable permanent housing and does not include staying outside in a car, in a tent, in an abandoned building, in an overnight shelter, or couch-surfing.) Yes   Are you worried about losing your housing? No   Lack of transportation? No   Unable to get utilities (heat,electricity)? No         6/2/2025   Fall Risk   Fallen 2 or more times in the past year? No   Trouble with walking or balance? No          6/2/2025   Activities of Daily Living- Home Safety   Needs help with the following daily activites None of the above   Safety concerns in the home None of the above         6/2/2025   Dental   Dentist two times every year? Yes         6/2/2025   Hearing Screening   Hearing concerns? (!) IT'S HARD TO FOLLOW A CONVERSATION IN A NOISY RESTAURANT OR CROWDED ROOM.    (!) TROUBLE UNDERSTANDING SOFT OR WHISPERED SPEECH.       Multiple values from one day are sorted in reverse-chronological order         6/2/2025   Driving Risk Screening   Patient/family members have concerns about driving No         6/2/2025   General Alertness/Fatigue Screening   Have you been more tired than usual lately? (!) YES         6/2/2025   Urinary Incontinence Screening   Bothered by leaking urine in past 6 months No         Today's PHQ-2 Score:       6/5/2025     9:34 AM   PHQ-2 ( 1999 Pf[employer])   Q1: Little interest or pleasure in doing things 1   Q2: Feeling down, depressed or hopeless 1   PHQ-2 Score 2    Q1: Little interest or pleasure in doing things Several days   Q2: Feeling down, depressed or hopeless Several days   PHQ-2 Score 2       Patient-reported           6/2/2025   Substance Use   Alcohol more than 3/day or more than 7/wk No   Do you have a current opioid prescription? No   How severe/bad is pain from 1 to 10? 6/10   Do you use any other substances recreationally? No     Social History     Tobacco Use    Smoking status: Former     Current packs/day: 0.00     Average packs/day: 0.5 packs/day for 20.0 years (10.0 ttl  pk-yrs)     Types: Cigarettes     Quit date: 1978     Years since quittin.4    Smokeless tobacco: Never    Tobacco comments:      X 20 years/no smokers in household   Vaping Use    Vaping status: Never Used   Substance Use Topics    Alcohol use: Not Currently     Comment: Very little    Drug use: No           2024   LAST FHS-7 RESULTS   1st degree relative breast or ovarian cancer No   Any relative bilateral breast cancer No   Any male have breast cancer No   Any ONE woman have BOTH breast AND ovarian cancer No   Any woman with breast cancer before 50yrs No   2 or more relatives with breast AND/OR ovarian cancer No   2 or more relatives with breast AND/OR bowel cancer No        Mammogram Screening - Mammogram every 1-2 years updated in Health Maintenance based on mutual decision making      History of abnormal Pap smear: No - age 65 or older with adequate negative prior screening test results (3 consecutive negative cytology results, 2 consecutive negative cotesting results, or 2 consecutive negative HrHPV test results within 10 years, with the most recent test occurring within the recommended screening interval for the test used)        Latest Ref Rng & Units 2018    10:51 AM 2018    10:50 AM 2015    10:00 AM   PAP / HPV   PAP (Historical)  NIL   NIL    HPV 16 DNA NEG^Negative  Negative  Negative    HPV 18 DNA NEG^Negative  Negative  Negative    Other HR HPV NEG^Negative  Negative  Negative      ASCVD Risk   The 10-year ASCVD risk score (Heather RODRIGUEZ, et al., 2019) is: 9.6%    Values used to calculate the score:      Age: 69 years      Sex: Female      Is Non- : No      Diabetic: No      Tobacco smoker: No      Systolic Blood Pressure: 130 mmHg      Is BP treated: No      HDL Cholesterol: 86 mg/dL      Total Cholesterol: 248 mg/dL          Reviewed and updated as needed this visit by Provider   Tobacco  Allergies  Meds  Problems  Med Hx  Surg Hx   "Fam Hx            Current providers sharing in care for this patient include:  Patient Care Team:  Dayan Bateman MD as PCP - General  Dayan Bateman MD as Assigned PCP    The following health maintenance items are reviewed in Epic and correct as of today:  Health Maintenance   Topic Date Due    COVID-19 VACCINE (7 - 2024-25 season) 04/07/2025    ANNUAL REVIEW OF HM ORDERS  05/23/2025    MAMMO SCREENING  05/23/2025    COLORECTAL CANCER SCREENING  07/15/2025    MEDICARE ANNUAL WELLNESS VISIT  06/05/2026    FALL RISK ASSESSMENT  06/05/2026    DIABETES SCREENING  05/23/2027    LIPID  05/23/2029    ADVANCE CARE PLANNING  05/23/2029    DTAP/TDAP/TD VACCINE (4 - Td or Tdap) 03/24/2033    DEXA  04/10/2038    HEPATITIS C SCREENING  Completed    PHQ-2 (once per calendar year)  Completed    INFLUENZA VACCINE  Completed    PNEUMOCOCCAL VACCINE 50+ YEARS  Completed    ZOSTER VACCINE  Completed    RSV VACCINE  Completed    HPV VACCINE  Aged Out    MENINGITIS VACCINE  Aged Out          Objective    Exam  /70 (BP Location: Left arm, Patient Position: Sitting, Cuff Size: Adult Regular)   Pulse 77   Temp 97.2  F (36.2  C) (Temporal)   Resp 12   Ht 1.536 m (5' 0.47\")   Wt 59 kg (130 lb)   LMP 04/01/2006   SpO2 99%   BMI 24.99 kg/m     Estimated body mass index is 24.99 kg/m  as calculated from the following:    Height as of this encounter: 1.536 m (5' 0.47\").    Weight as of this encounter: 59 kg (130 lb).    Physical Exam  Constitutional:       General: She is not in acute distress.     Appearance: She is well-developed.   HENT:      Right Ear: Tympanic membrane and external ear normal.      Left Ear: Tympanic membrane and external ear normal.      Nose: Nose normal.   Eyes:      General:         Right eye: No discharge.         Left eye: No discharge.      Conjunctiva/sclera: Conjunctivae normal.      Pupils: Pupils are equal, round, and reactive to light.   Neck:      Thyroid: No thyroid mass. "   Cardiovascular:      Rate and Rhythm: Normal rate and regular rhythm.      Heart sounds: Normal heart sounds, S1 normal and S2 normal. No murmur heard.  Pulmonary:      Effort: Pulmonary effort is normal. No respiratory distress.      Breath sounds: Normal breath sounds. No wheezing or rales.   Abdominal:      General: Bowel sounds are normal.      Palpations: Abdomen is soft. There is no mass.      Tenderness: There is no abdominal tenderness.   Genitourinary:     Comments: Few small epidermal cyst bilateral labia  Musculoskeletal:         General: Normal range of motion.      Cervical back: Neck supple.   Lymphadenopathy:      Cervical: No cervical adenopathy.   Skin:     General: Skin is warm and dry.      Findings: No rash.   Neurological:      Mental Status: She is alert and oriented to person, place, and time.   Psychiatric:         Thought Content: Thought content normal.         Judgment: Judgment normal.               6/5/2025   Mini Cog   Clock Draw Score 2 Normal   3 Item Recall 3 objects recalled   Mini Cog Total Score 5              Signed Electronically by: Dayan Bateman MD

## 2025-06-05 NOTE — PATIENT INSTRUCTIONS
Patient Education   Preventive Care Advice   This is general advice given by our system to help you stay healthy. However, your care team may have specific advice just for you. Please talk to your care team about your preventive care needs.  Nutrition  Eat 5 or more servings of fruits and vegetables each day.  Try wheat bread, brown rice and whole grain pasta (instead of white bread, rice, and pasta).  Get enough calcium and vitamin D. Check the label on foods and aim for 100% of the RDA (recommended daily allowance).  Lifestyle  Exercise at least 150 minutes each week  (30 minutes a day, 5 days a week).  Do muscle strengthening activities 2 days a week. These help control your weight and prevent disease.  No smoking.  Wear sunscreen to prevent skin cancer.  Have a dental exam and cleaning every 6 months.  Yearly exams  See your health care team every year to talk about:  Any changes in your health.  Any medicines your care team has prescribed.  Preventive care, family planning, and ways to prevent chronic diseases.  Shots (vaccines)   HPV shots (up to age 26), if you've never had them before.  Hepatitis B shots (up to age 59), if you've never had them before.  COVID-19 shot: Get this shot when it's due.  Flu shot: Get a flu shot every year.  Tetanus shot: Get a tetanus shot every 10 years.  Pneumococcal, hepatitis A, and RSV shots: Ask your care team if you need these based on your risk.  Shingles shot (for age 50 and up)  General health tests  Diabetes screening:  Starting at age 35, Get screened for diabetes at least every 3 years.  If you are younger than age 35, ask your care team if you should be screened for diabetes.  Cholesterol test: At age 39, start having a cholesterol test every 5 years, or more often if advised.  Bone density scan (DEXA): At age 50, ask your care team if you should have this scan for osteoporosis (brittle bones).  Hepatitis C: Get tested at least once in your life.  STIs (sexually  transmitted infections)  Before age 24: Ask your care team if you should be screened for STIs.  After age 24: Get screened for STIs if you're at risk. You are at risk for STIs (including HIV) if:  You are sexually active with more than one person.  You don't use condoms every time.  You or a partner was diagnosed with a sexually transmitted infection.  If you are at risk for HIV, ask about PrEP medicine to prevent HIV.  Get tested for HIV at least once in your life, whether you are at risk for HIV or not.  Cancer screening tests  Cervical cancer screening: If you have a cervix, begin getting regular cervical cancer screening tests starting at age 21.  Breast cancer scan (mammogram): If you've ever had breasts, begin having regular mammograms starting at age 40. This is a scan to check for breast cancer.  Colon cancer screening: It is important to start screening for colon cancer at age 45.  Have a colonoscopy test every 10 years (or more often if you're at risk) Or, ask your provider about stool tests like a FIT test every year or Cologuard test every 3 years.  To learn more about your testing options, visit:   .  For help making a decision, visit:   https://bit.ly/bm94523.  Prostate cancer screening test: If you have a prostate, ask your care team if a prostate cancer screening test (PSA) at age 55 is right for you.  Lung cancer screening: If you are a current or former smoker ages 50 to 80, ask your care team if ongoing lung cancer screenings are right for you.  For informational purposes only. Not to replace the advice of your health care provider. Copyright   2023 [facility]. All rights reserved. Clinically reviewed by the [facility] Transitions Program. [employer] 613206 - REV 01/24.  Hearing Loss: Care Instructions  Overview     Hearing loss is a sudden or slow decrease in how well you hear. It can range from slight to profound. Permanent hearing loss can occur with aging. It also can  happen when you are exposed long-term to loud noise. Examples include listening to loud music, riding motorcycles, or being around other loud machines.  Hearing loss can affect your work and home life. It can make you feel lonely or depressed. You may feel that you have lost your independence. But hearing aids and other devices can help you hear better and feel connected to others.  Follow-up care is a key part of your treatment and safety. Be sure to make and go to all appointments, and call your doctor if you are having problems. It's also a good idea to know your test results and keep a list of the medicines you take.  How can you care for yourself at home?  Avoid loud noises whenever possible. This helps keep your hearing from getting worse.  Always wear hearing protection around loud noises.  Wear a hearing aid as directed.  A professional can help you pick a hearing aid that will work best for you.  You can also get hearing aids over the counter for mild to moderate hearing loss.  Have hearing tests as your doctor suggests. They can show whether your hearing has changed. Your hearing aid may need to be adjusted.  Use other devices as needed. These may include:  Telephone amplifiers and hearing aids that can connect to a television, stereo, radio, or microphone.  Devices that use lights or vibrations. These alert you to the doorbell, a ringing telephone, or a baby monitor.  Television closed-captioning. This shows the words at the bottom of the screen. Most new TVs can do this.  TTY (text telephone). This lets you type messages back and forth on the telephone instead of talking or listening. These devices are also called TDD. When messages are typed on the keyboard, they are sent over the phone line to a receiving TTY. The message is shown on a monitor.  Use text messaging, social media, and email if it is hard for you to communicate by telephone.  Try to learn a listening technique called speechreading. It is  "not lipreading. You pay attention to people's gestures, expressions, posture, and tone of voice. These clues can help you understand what a person is saying. Face the person you are talking to, and have them face you. Make sure the lighting is good. You need to see the other person's face clearly.  Think about counseling if you need help to adjust to your hearing loss.  When should you call for help?  Watch closely for changes in your health, and be sure to contact your doctor if:    You think your hearing is getting worse.     You have new symptoms, such as dizziness or nausea.   Where can you learn more?  Go to https://www.[employer]im[employer].net/patiented  Enter R798 in the search box to learn more about \"Hearing Loss: Care Instructions.\"  Current as of: October 27, 2024  Content Version: 14.4    3277-5938 [employer].   Care instructions adapted under license by your healthcare professional. If you have questions about a medical condition or this instruction, always ask your healthcare professional. [employer] disclaims any warranty or liability for your use of this information.    Learning About Sleeping Well  What does sleeping well mean?     Sleeping well means getting enough sleep to feel good and stay healthy. How much sleep is enough varies among people.  The number of hours you sleep and how you feel when you wake up are both important. If you do not feel refreshed, you probably need more sleep. Another sign of not getting enough sleep is feeling tired during the day.  Experts recommend that adults get at least 7 or more hours of sleep per day. Children and older adults need more sleep.  Why is getting enough sleep important?  Getting enough quality sleep is a basic part of good health. When your sleep suffers, your physical health, mood, and your thoughts can suffer too. You may find yourself feeling more grumpy or stressed. Not getting enough sleep also can lead to serious problems, " "including injury, accidents, anxiety, and depression.  What might cause poor sleeping?  Many things can cause sleep problems, including:  Changes to your sleep schedule.  Stress. Stress can be caused by fear about a single event, such as giving a speech. Or you may have ongoing stress, such as worry about work or school.  Depression, anxiety, and other mental or emotional conditions.  Changes in your sleep habits or surroundings. This includes changes that happen where you sleep, such as noise, light, or sleeping in a different bed. It also includes changes in your sleep pattern, such as having jet lag or working a late shift.  Health problems, such as pain, breathing problems, and restless legs syndrome.  Lack of regular exercise.  Using alcohol, nicotine, or caffeine before bed.  How can you help yourself?  Here are some tips that may help you sleep more soundly and wake up feeling more refreshed.  Your sleeping area   Use your bedroom only for sleeping and sex. A bit of light reading may help you fall asleep. But if it doesn't, do your reading elsewhere in the house. Try not to use your TV, computer, smartphone, or tablet while you are in bed.  Be sure your bed is big enough to stretch out comfortably, especially if you have a sleep partner.  Keep your bedroom quiet, dark, and cool. Use curtains, blinds, or a sleep mask to block out light. To block out noise, use earplugs, soothing music, or a \"white noise\" machine.  Your evening and bedtime routine   Create a relaxing bedtime routine. You might want to take a warm shower or bath, or listen to soothing music.  Go to bed at the same time every night. And get up at the same time every morning, even if you feel tired.  What to avoid   Limit caffeine (coffee, tea, caffeinated sodas) during the day, and don't have any for at least 6 hours before bedtime.  Avoid drinking alcohol before bedtime. Alcohol can cause you to wake up more often during the night.  Try not to " "smoke or use tobacco, especially in the evening. Nicotine can keep you awake.  Limit naps during the day, especially close to bedtime.  Avoid lying in bed awake for too long. If you can't fall asleep or if you wake up in the middle of the night and can't get back to sleep within about 20 minutes, get out of bed and go to another room until you feel sleepy.  Avoid taking medicine right before bed that may keep you awake or make you feel hyper or energized. Your doctor can tell you if your medicine may do this and if you can take it earlier in the day.  If you can't sleep   Imagine yourself in a peaceful, pleasant scene. Focus on the details and feelings of being in a place that is relaxing.  Get up and do a quiet or boring activity until you feel sleepy.  Avoid drinking any liquids before going to bed to help prevent waking up often to use the bathroom.  Where can you learn more?  Go to https://www.[employer].net/patiented  Enter J942 in the search box to learn more about \"Learning About Sleeping Well.\"  Current as of: July 31, 2024  Content Version: 14.4    4621-7046 [employer].   Care instructions adapted under license by your healthcare professional. If you have questions about a medical condition or this instruction, always ask your healthcare professional. [employer] disclaims any warranty or liability for your use of this information.       "

## 2025-06-15 ENCOUNTER — PATIENT OUTREACH (OUTPATIENT)
Dept: CARE COORDINATION | Facility: CLINIC | Age: 70
End: 2025-06-15
Payer: COMMERCIAL

## 2025-06-18 ENCOUNTER — TELEPHONE (OUTPATIENT)
Dept: FAMILY MEDICINE | Facility: OTHER | Age: 70
End: 2025-06-18
Payer: COMMERCIAL

## 2025-06-18 DIAGNOSIS — Z12.11 COLON CANCER SCREENING: Primary | ICD-10-CM

## 2025-06-18 NOTE — TELEPHONE ENCOUNTER
----- Message from Erickson DIAZ sent at 6/18/2025 10:19 AM CDT -----  Regarding: SCREENING COLONOSCOPY ORDER  Hi Dr. Bateman,    The pt called to schedule their next screening colonoscopy. Can you please place a referral for their procedure?    Thank you,    Jerald CHRISTINA  Endoscopy Scheduling Team

## 2025-06-19 ENCOUNTER — TELEPHONE (OUTPATIENT)
Dept: GASTROENTEROLOGY | Facility: CLINIC | Age: 70
End: 2025-06-19
Payer: COMMERCIAL

## 2025-06-19 NOTE — TELEPHONE ENCOUNTER
"Endoscopy Scheduling Screen    Caller: patient    Have you had any respiratory illness or flu-like symptoms in the last 10 days?  No    What is your communication preference for Instructions and/or Bowel Prep?   MyChart    What insurance is in the chart?  Other:  [facility]    Ordering/Referring Provider: Dayan Bateman MD     (If ordering provider performs procedure, schedule with ordering provider unless otherwise instructed. )    BMI: Estimated body mass index is 24.99 kg/m  as calculated from the following:    Height as of 6/5/25: 1.536 m (5' 0.47\").    Weight as of 6/5/25: 59 kg (130 lb).     Sedation Ordered  moderate sedation.   If patient BMI > 50 do not schedule in [facility].    If patient BMI > 45 [facility]    Are you taking methadone or Suboxone?  NO, No RN review required.    Have you been diagnosed and are being treated for severe PTSD or severe anxiety?  NO, No RN review required.    Are you taking any prescription medications for pain 3 or more times per week?   NO, No RN review required.    Do you have a history of malignant hyperthermia?  No    (Females) Are you currently pregnant?   No     Have you been diagnosed or told you have pulmonary hypertension?   No    Do you have an LVAD?  No    Have you been told you have moderate to severe sleep apnea?  No.    Have you been told you have COPD, asthma, or any other lung disease?  No    Has your doctor ordered any cardiac tests like echo, angiogram, stress test, ablation, or EKG, that you have not completed yet?  No    Do you  have a history of any heart conditions?  No     Have you ever had or are you waiting for an organ transplant?  No. Continue scheduling, no site restrictions.    Have you had a stroke or transient ischemic attack (TIA aka \"mini stroke\") in the last 2 years?   No.    Have you been diagnosed with or been told you have cirrhosis of the liver?   No.    Are you currently on dialysis?   No    Do you need assistance " "transferring?   No    BMI: Estimated body mass index is 24.99 kg/m  as calculated from the following:    Height as of 6/5/25: 1.536 m (5' 0.47\").    Weight as of 6/5/25: 59 kg (130 lb).     Is patients BMI > 40 and scheduling location [facility]?  No    Do you take an injectable or oral medication for weight loss or diabetes (excluding insulin)?  No    Do you take the medication Naltrexone?  No    Do you take blood thinners?  No       Prep   Are you currently on dialysis or do you have chronic kidney disease?  No    Do you have a diagnosis of diabetes?  No    Do you have a diagnosis of cystic fibrosis (CF)?  No    On a regular basis do you go 3 -5 days between bowel movements?  No    BMI > 40?  No    Preferred Pharmacy:    [address] [address]  Phone: [phone] Fax: [phone]        Final Scheduling Details     Procedure scheduled  Colonoscopy    Surgeon:  NOEMY     Date of procedure:  7/17/25     Pre-OP / PAC:   No - Not required for this site.    Location  [facility] - Patient preference.    Sedation   MAC/Deep Sedation PER LOCATION      Patient Reminders:   You will receive a call from a Nurse to review instructions and health history.  This assessment must be completed prior to your procedure.  Failure to complete the Nurse assessment may result in the procedure being cancelled.      On the day of your procedure, please designate an adult(s) who can drive you home stay with you for the next 24 hours. The medicines used in the exam will make you sleepy. You will not be able to drive.      You cannot take public transportation, ride share services, or non-medical taxi service without a responsible caregiver.  Medical transport services are allowed with the requirement that a responsible caregiver will receive you at your destination.  We require that drivers and caregivers are confirmed prior to your procedure.  "

## 2025-06-24 ENCOUNTER — NURSE TRIAGE (OUTPATIENT)
Dept: FAMILY MEDICINE | Facility: OTHER | Age: 70
End: 2025-06-24
Payer: COMMERCIAL

## 2025-06-24 NOTE — TELEPHONE ENCOUNTER
RN Triage    Patient Contact    Attempt # 1    Was call answered?  No.  Left message on voicemail with information to call me back.    Jennifer LÓPEZN, RN

## 2025-06-24 NOTE — TELEPHONE ENCOUNTER
Nurse Triage SBAR    Is this a 2nd Level Triage? NO    Situation: patient sent in [facility] message reporting dizziness wondering about upcoming colonoscopy.     Background: has not been diagnosed with vertigo in past, goes to chiropractor once a week, last adjusted a few days prior to dizziness starting.    Assessment: A/O. Monday the 16th patient ate dinner normally, a few hours later became very sick quickly. Vomiting and diarrhea that patient states lasted 7-10 minutes. Went to bed and felt fine. Woke up at 2am with headache, room was spinning, couldn't tell if she was upside down or right side up. Was so dizzy couldn't sit up and felt unbalanced, went to bathroom and vomited then felt better. Slept rest of night. Tuesday 17th felt dizzy when moving head or changing positions. Patient has increased her water intake since then. States she was working out in garden and it was hot outside, patient wondering if she was dehydrated. For past week patient has felt better will occasionally get dizzy with position changes, states dizziness will happen quickly and go away quickly. Does not feel unsteady on feet. Does not have any other sick like symptoms. No fevers, denies confusion, slurred speak, weakness. States dizziness is gradually getting better.     Protocol Recommended Disposition:   Home Care    Recommendation: Patient states if not completely resolved prior to colonoscopy she would like to be seen. Appointment made with PCP for follow up on dizziness. Advised to increase fluids and change positions slowly.     [facility] provider - [facility]    Does the patient meet one of the following criteria for ADS visit consideration? No    Reason for Disposition   Dizziness caused by sudden or prolonged standing   Dizziness caused by recent heat exposure    Additional Information   Negative: SEVERE difficulty breathing (e.g., struggling for each breath, speaks in single words)   Negative: Shock suspected (e.g., cold/pale/clammy  skin, too weak to stand, low BP, rapid pulse)   Negative: Difficult to awaken or acting confused (e.g., disoriented, slurred speech)   Negative: Fainted, and still feels dizzy afterwards   Negative: Overdose (accidental or intentional) of medications   Negative: New neurologic deficit that is present now: * Weakness of the face, arm, or leg on one side of the body * Numbness of the face, arm, or leg on one side of the body * Loss of speech or garbled speech   Negative: Heart beating < 50 beats per minute OR > 140 beats per minute   Negative: Sounds like a life-threatening emergency to the triager   Negative: Chest pain   Negative: Rectal bleeding, bloody stool, or tarry-black stool   Negative: Vomiting is main symptom   Negative: Diarrhea is main symptom   Negative: Headache is main symptom   Negative: Heat exhaustion suspected (i.e., dehydration from heat exposure)   Negative: Patient states that they are having an anxiety or panic attack   Negative: Dizziness from low blood sugar (i.e., < 60 mg/dl or 3.5 mmol/l)   Negative: SEVERE dizziness (e.g., unable to stand, requires support to walk, feels like passing out now)   Negative: SEVERE headache or neck pain   Negative: Spinning or tilting sensation (vertigo) present now and one or more stroke risk factors (i.e., hypertension, diabetes mellitus, prior stroke/TIA, heart attack, age over 60) (Exception: Prior physician evaluation for this AND no different/worse than usual.)   Negative: Neurologic deficit that was brief (now gone), ANY of the following:* Weakness of the face, arm, or leg on one side of the body* Numbness of the face, arm, or leg on one side of the body* Loss of speech or garbled speech   Negative: Loss of vision or double vision  (Exception: Similar to previous migraines.)   Negative: Extra heartbeats, irregular heart beating, or heart is beating very fast (i.e., 'palpitations')   Negative: Difficulty breathing   Negative: Drinking very little and  dehydration suspected (e.g., no urine > 12 hours, very dry mouth, very lightheaded)   Negative: Follows bleeding (e.g., stomach, rectum, vagina)  (Exception: Became dizzy from sight of small amount blood.)   Negative: Patient sounds very sick or weak to the triager   Negative: Lightheadedness (dizziness) present now, after 2 hours of rest and fluids   Negative: Spinning or tilting sensation (vertigo) present now   Negative: Fever > 103 F (39.4 C)   Negative: Fever > 100.0 F (37.8 C) and has diabetes mellitus or a weak immune system (e.g., HIV positive, cancer chemotherapy, organ transplant, splenectomy, chronic steroids)   Negative: MODERATE dizziness (e.g., interferes with normal activities)  (Exception: Dizziness caused by heat exposure, sudden standing, or poor fluid intake.)   Negative: Vomiting occurs with dizziness   Negative: Patient wants to be seen   Negative: Taking a medicine that could cause dizziness (e.g., blood pressure medications, diuretics)   Negative: MILD dizziness (e.g., walking normally) and has NOT been evaluated by physician for this (Exception: Dizziness caused by heat exposure, sudden standing, or poor fluid intake.)   Negative: Substance use (drug use) or unhealthy alcohol use, known or suspected   Negative: Dizziness caused by not drinking enough fluids   Negative: Dizziness not present now, but is a chronic symptom (recurrent or ongoing AND lasting > 4 weeks)    Protocols used: Dizziness-A-OH

## 2025-06-25 ENCOUNTER — TELEPHONE (OUTPATIENT)
Dept: GASTROENTEROLOGY | Facility: CLINIC | Age: 70
End: 2025-06-25
Payer: COMMERCIAL

## 2025-06-25 NOTE — TELEPHONE ENCOUNTER
Bowel Prep Review:  Disclaimer: No call was made to the patient.     Standard Miralax bowel prep.    Recommended due to standard bowel prep.   Instructions were sent via abigail Warren LPN  Endoscopy Procedure Pre Assessment

## 2025-06-26 NOTE — TELEPHONE ENCOUNTER
Pre visit planning completed.      Procedure details:    Patient scheduled for Colonoscopy on 7/17/25.     Arrival time: 1130. Procedure time 1230    Facility location: [facility]; [address] , [place] [address]. Check in location: Main entrance at Surgery registation desk.    Sedation type: MAC    Pre op exam needed? No.    Indication for procedure: Screen      Chart review:     Electronic implanted devices? No    Recent diagnosis of diverticulitis within the last 6 weeks? No      Medication review:    Diabetic? Prediabetic. Not on diabetic medications.    Anticoagulants? No    Weight loss medication/injectable? No GLP-1 medication per patient's medication list. Nursing to verify with pre-assessment call.    Other medication HOLDING recommendations:  N/A      Prep for procedure:     Bowel prep recommendation: Standard Miralax.   Due to: standard bowel prep    Procedure information and instructions sent via abigail Porter RN  Endoscopy Procedure Pre Assessment   [phone] option 3

## 2025-06-26 NOTE — TELEPHONE ENCOUNTER
Attempted to contact patient in order to complete pre assessment questions.     No answer. Left message to return call to [phone] option 3.    Callback communication sent via [employer].    Suri Warren LPN

## 2025-07-14 ENCOUNTER — TELEPHONE (OUTPATIENT)
Dept: GASTROENTEROLOGY | Facility: CLINIC | Age: 70
End: 2025-07-14
Payer: COMMERCIAL

## 2025-07-16 ENCOUNTER — ANESTHESIA EVENT (OUTPATIENT)
Dept: GASTROENTEROLOGY | Facility: CLINIC | Age: 70
End: 2025-07-16
Payer: COMMERCIAL

## 2025-07-17 ENCOUNTER — HOSPITAL ENCOUNTER (OUTPATIENT)
Facility: CLINIC | Age: 70
Discharge: HOME | End: 2025-07-17
Attending: SURGERY | Admitting: SURGERY
Payer: COMMERCIAL

## 2025-07-17 ENCOUNTER — ANESTHESIA (OUTPATIENT)
Dept: GASTROENTEROLOGY | Facility: CLINIC | Age: 70
End: 2025-07-17
Payer: COMMERCIAL

## 2025-07-17 VITALS
DIASTOLIC BLOOD PRESSURE: 75 MMHG | TEMPERATURE: 97.6 F | RESPIRATION RATE: 18 BRPM | SYSTOLIC BLOOD PRESSURE: 116 MMHG | HEART RATE: 77 BPM | OXYGEN SATURATION: 95 %

## 2025-07-17 LAB — COLONOSCOPY: NORMAL

## 2025-07-17 PROCEDURE — 250N000011 HC RX IP 250 OP 636

## 2025-07-17 PROCEDURE — 45385 COLONOSCOPY W/LESION REMOVAL: CPT | Mod: PT | Performed by: SURGERY

## 2025-07-17 PROCEDURE — 88305 TISSUE EXAM BY PATHOLOGIST: CPT | Mod: TC | Performed by: SURGERY

## 2025-07-17 PROCEDURE — 370N000017 HC ANESTHESIA TECHNICAL FEE, PER MIN: Performed by: SURGERY

## 2025-07-17 PROCEDURE — 258N000003 HC RX IP 258 OP 636: Performed by: NURSE ANESTHETIST, CERTIFIED REGISTERED

## 2025-07-17 PROCEDURE — 45380 COLONOSCOPY AND BIOPSY: CPT | Performed by: SURGERY

## 2025-07-17 RX ORDER — NALOXONE HYDROCHLORIDE 0.4 MG/ML
0.2 INJECTION, SOLUTION INTRAMUSCULAR; INTRAVENOUS; SUBCUTANEOUS
Status: DISCONTINUED | OUTPATIENT
Start: 2025-07-17 | End: 2025-07-17 | Stop reason: HOSPADM

## 2025-07-17 RX ORDER — PROCHLORPERAZINE MALEATE 5 MG/1
5 TABLET ORAL EVERY 6 HOURS PRN
Status: DISCONTINUED | OUTPATIENT
Start: 2025-07-17 | End: 2025-07-17 | Stop reason: HOSPADM

## 2025-07-17 RX ORDER — SODIUM CHLORIDE, SODIUM LACTATE, POTASSIUM CHLORIDE, CALCIUM CHLORIDE 600; 310; 30; 20 MG/100ML; MG/100ML; MG/100ML; MG/100ML
INJECTION, SOLUTION INTRAVENOUS CONTINUOUS
Status: DISCONTINUED | OUTPATIENT
Start: 2025-07-17 | End: 2025-07-17 | Stop reason: HOSPADM

## 2025-07-17 RX ORDER — NALOXONE HYDROCHLORIDE 0.4 MG/ML
0.4 INJECTION, SOLUTION INTRAMUSCULAR; INTRAVENOUS; SUBCUTANEOUS
Status: DISCONTINUED | OUTPATIENT
Start: 2025-07-17 | End: 2025-07-17 | Stop reason: HOSPADM

## 2025-07-17 RX ORDER — ONDANSETRON 4 MG/1
4 TABLET, ORALLY DISINTEGRATING ORAL EVERY 6 HOURS PRN
Status: DISCONTINUED | OUTPATIENT
Start: 2025-07-17 | End: 2025-07-17 | Stop reason: HOSPADM

## 2025-07-17 RX ORDER — ONDANSETRON 2 MG/ML
4 INJECTION INTRAMUSCULAR; INTRAVENOUS EVERY 6 HOURS PRN
Status: DISCONTINUED | OUTPATIENT
Start: 2025-07-17 | End: 2025-07-17 | Stop reason: HOSPADM

## 2025-07-17 RX ORDER — ONDANSETRON 2 MG/ML
4 INJECTION INTRAMUSCULAR; INTRAVENOUS
Status: DISCONTINUED | OUTPATIENT
Start: 2025-07-17 | End: 2025-07-17 | Stop reason: HOSPADM

## 2025-07-17 RX ORDER — PROPOFOL 10 MG/ML
INJECTION, EMULSION INTRAVENOUS PRN
Status: DISCONTINUED | OUTPATIENT
Start: 2025-07-17 | End: 2025-07-17

## 2025-07-17 RX ORDER — FLUMAZENIL 0.1 MG/ML
0.2 INJECTION, SOLUTION INTRAVENOUS
Status: DISCONTINUED | OUTPATIENT
Start: 2025-07-17 | End: 2025-07-17 | Stop reason: HOSPADM

## 2025-07-17 RX ORDER — LIDOCAINE 40 MG/G
CREAM TOPICAL
Status: DISCONTINUED | OUTPATIENT
Start: 2025-07-17 | End: 2025-07-17 | Stop reason: HOSPADM

## 2025-07-17 RX ORDER — PROPOFOL 10 MG/ML
INJECTION, EMULSION INTRAVENOUS CONTINUOUS PRN
Status: DISCONTINUED | OUTPATIENT
Start: 2025-07-17 | End: 2025-07-17

## 2025-07-17 RX ADMIN — SODIUM CHLORIDE, SODIUM LACTATE, POTASSIUM CHLORIDE, AND CALCIUM CHLORIDE: .6; .31; .03; .02 INJECTION, SOLUTION INTRAVENOUS at 11:38

## 2025-07-17 RX ADMIN — PROPOFOL 150 MCG/KG/MIN: 10 INJECTION, EMULSION INTRAVENOUS at 11:43

## 2025-07-17 RX ADMIN — PROPOFOL 80 MG: 10 INJECTION, EMULSION INTRAVENOUS at 11:43

## 2025-07-17 ASSESSMENT — ACTIVITIES OF DAILY LIVING (ADL)
ADLS_ACUITY_SCORE: 41
ADLS_ACUITY_SCORE: 41

## 2025-07-17 NOTE — ANESTHESIA CARE TRANSFER NOTE
Patient: Enedelia Abraham    Procedure: Procedure(s):  COLONOSCOPY, WITH POLYPECTOMY using snare       Diagnosis: Colon cancer screening [Z12.11]  Diagnosis Additional Information: No value filed.    Anesthesia Type:   MAC     Note:    Oropharynx: oropharynx clear of all foreign objects and spontaneously breathing  Level of Consciousness: drowsy  Oxygen Supplementation: room air    Independent Airway: airway patency satisfactory and stable  Dentition: dentition unchanged  Vital Signs Stable: post-procedure vital signs reviewed and stable  Report to RN Given: handoff report given  Patient transferred to: PACU    Handoff Report: Identifed the Patient, Identified the Reponsible Provider, Reviewed the pertinent medical history, Discussed the surgical course, Reviewed Intra-OP anesthesia mangement and issues during anesthesia, Set expectations for post-procedure period and Allowed opportunity for questions and acknowledgement of understanding      Vitals:  Vitals Value Taken Time   /88 07/17/25 12:20   Temp     Pulse 86 07/17/25 12:20   Resp 18 07/17/25 12:10   SpO2 96 % 07/17/25 12:23   Vitals shown include unfiled device data.    Electronically Signed By: BARRERA Dodd CRNA  July 17, 2025  2:29 PM

## 2025-07-17 NOTE — H&P
Patient seen for Endoscopy    HPI:  Patient is a 69 year old female here for endoscopy. Not currently taking blood thinning medications/held for procedure if they do. No recent MI or CVA history. No issues with previous sedation. No recent acute illness.    Review Of Systems    Skin: negative  Ears/Nose/Throat: negative  Respiratory: No shortness of breath, dyspnea on exertion, cough, or hemoptysis  Cardiovascular: negative  Gastrointestinal: negative  Genitourinary: negative  Musculoskeletal: negative  Neurologic: negative  Hematologic/Lymphatic/Immunologic: negative  Endocrine: negative      Past Medical History:   Diagnosis Date    Arthritis     Backache, unspecified 2000    fracture of L5    Excessive or frequent menstruation 1997    Menorrhagia 1997 and 3/1997    Pilonidal cyst without mention of abscess     Surgery to remove    Unspecified multiple gestation, delivered     , Vag       Past Surgical History:   Procedure Laterality Date    ABDOMEN SURGERY      CATARACT IOL, RT/LT Bilateral 10/8/18 and 10/28/18    Cataract IOL RT/LT    COLONOSCOPY N/A 07/15/2015    Procedure: COLONOSCOPY;  Surgeon: Frederick Flores MD;  Location: [facility]    DRAIN PILONIDAL CYST SIMPL      Pilonidal cyst surgery    ESOPHAGOSCOPY, GASTROSCOPY, DUODENOSCOPY (EGD), COMBINED  2013    Procedure: COMBINED ESOPHAGOSCOPY, GASTROSCOPY, DUODENOSCOPY (EGD), BIOPSY SINGLE OR MULTIPLE;  ESOPHAGOSCOPY, GASTROSCOPY, DUODENOSCOPY (EGD) with Multiple Biopsy's;  Surgeon: Frederick Dixon MD;  Location: [facility]    HC DILATION/CURETTAGE DIAG/THER NON OB      D & C    ZZC LUMBAR SPINE FUSION,ANTER APPRCH  2001    posterio of L5, S1    ZZHC COLONOSCOPY W BIOPSY  2005       Family History   Problem Relation Age of Onset    Breast Cancer Other 40        maternal neice    Diabetes Mother         diet controlled    Lipids Mother     Alcohol/Drug Father     Cancer Maternal Grandmother         breast    Cancer Maternal  Grandfather         leukemia    Alcohol/Drug Brother     Alcohol/Drug Brother     Alcohol/Drug Brother     Alcohol/Drug Brother     Diabetes Brother         insulin    Coronary Artery Disease Brother         Passed away    Substance Abuse Sister         Passed- ischemic & necrotic bowel, progressing dementia       Social History     Socioeconomic History    Marital status:      Spouse name: Jairo    Number of children: 2    Years of education: 13    Highest education level: Not on file   Occupational History    Occupation:    Tobacco Use    Smoking status: Former     Current packs/day: 0.00     Average packs/day: 0.5 packs/day for 20.0 years (10.0 ttl pk-yrs)     Types: Cigarettes     Quit date: 1978     Years since quittin.5    Smokeless tobacco: Never    Tobacco comments:      X 20 years/no smokers in household   Vaping Use    Vaping status: Never Used   Substance and Sexual Activity    Alcohol use: Not Currently     Comment: Very little    Drug use: No    Sexual activity: Not Currently     Partners: Male     Birth control/protection: None     Comment: vasectomy   Other Topics Concern     Service No    Blood Transfusions No    Caffeine Concern No    Occupational Exposure No    Hobby Hazards Yes     Comment: [place] riding    Sleep Concern No    Stress Concern No    Weight Concern No    Special Diet Not Asked    Back Care Not Asked    Exercise Yes     Comment: walking 2 miles daily, weights QD    Bike Helmet Not Asked    Seat Belt Yes    Self-Exams Yes    Parent/sibling w/ CABG, MI or angioplasty before 65F 55M? Yes   Social History Narrative    Not on file     Social Drivers of Health     Financial Resource Strain: Low Risk  (2025)    Financial Resource Strain     Within the past 12 months, have you or your family members you live with been unable to get utilities (heat, electricity) when it was really needed?: No   Food Insecurity: Low Risk  (2025)    Food Insecurity      Within the past 12 months, did you worry that your food would run out before you got money to buy more?: No     Within the past 12 months, did the food you bought just not last and you didn t have money to get more?: No   Transportation Needs: Low Risk  (6/2/2025)    Transportation Needs     Within the past 12 months, has lack of transportation kept you from medical appointments, getting your medicines, non-medical meetings or appointments, work, or from getting things that you need?: No   Physical Activity: Sufficiently Active (6/2/2025)    Exercise Vital Sign     Days of Exercise per Week: 4 days     Minutes of Exercise per Session: 50 min   Stress: No Stress Concern Present (6/2/2025)    [employer] - Occupational Stress Questionnaire     Feeling of Stress : Only a little   Social Connections: Unknown (5/22/2024)    Social Connection and Isolation Panel [NHANES]     Frequency of Communication with Friends and Family: Not on file     Frequency of Social Gatherings with Friends and Family: Once a week     Attends Denominational Services: Not on file     Active Member of Clubs or Organizations: Not on file     Attends Club or Organization Meetings: Not on file     Marital Status: Not on file   Interpersonal Safety: Low Risk  (7/17/2025)    Interpersonal Safety     Do you feel physically and emotionally safe where you currently live?: Yes     Within the past 12 months, have you been hit, slapped, kicked or otherwise physically hurt by someone?: No     Within the past 12 months, have you been humiliated or emotionally abused in other ways by your partner or ex-partner?: No   Housing Stability: Low Risk  (6/2/2025)    Housing Stability     Do you have housing? : Yes     Are you worried about losing your housing?: No       No current outpatient medications on file.       Medications and history reviewed    Physical exam:  Vitals: BP (!) 149/96   Pulse 92   Temp 97.6  F (36.4  C) (Temporal)    Resp 16   LMP 04/01/2006   SpO2 98%   BMI= There is no height or weight on file to calculate BMI.    Constitutional: Healthy, alert, non-distressed   Head: Normo-cephalic, atraumatic, no lesions, masses or tenderness   Cardiovascular: RRR, no new murmurs, +S1, +S2 heart sounds, no clicks, rubs or gallops   Respiratory: CTAB, no rales, rhonchi or wheezing, equal chest rise, good respiratory effort   Gastrointestinal: Soft, non-tender, non distended, no rebound rigidity or guarding, no masses or hernias palpated   : Deferred  Musculoskeletal: Moves all extremities, normal  strength, no deformities noted   Skin: No suspicious lesions or rashes   Psychiatric: Mentation appears normal, affect appropriate   Hematologic/Lymphatic/Immunologic: Normal cervical and supraclavicular lymph nodes   Patient able to get up on table without difficulty.    Labs show:  No results found for this or any previous visit (from the past 24 hours).    Assessment: Endoscopy  Plan: Pt cleared for anesthesia for proposed procedure.    Dewayne Bazzi DO

## 2025-07-17 NOTE — ANESTHESIA POSTPROCEDURE EVALUATION
Patient: Enedelia Abraham    Procedure: Procedure(s):  COLONOSCOPY, WITH POLYPECTOMY using snare       Anesthesia Type:  MAC    Note:  Disposition: Outpatient   Postop Pain Control: Uneventful            Sign Out: Well controlled pain   PONV: No   Neuro/Psych: Uneventful            Sign Out: Acceptable/Baseline neuro status   Airway/Respiratory: Uneventful            Sign Out: Acceptable/Baseline resp. status   CV/Hemodynamics: Uneventful            Sign Out: Acceptable CV status   Other NRE: NONE   DID A NON-ROUTINE EVENT OCCUR? No    Event details/Postop Comments:  Pt was happy with anesthesia care.  No complications.  I will follow up with the pt if needed.           Last vitals:  Vitals Value Taken Time   /88 07/17/25 12:20   Temp     Pulse 86 07/17/25 12:20   Resp 18 07/17/25 12:10   SpO2 96 % 07/17/25 12:23   Vitals shown include unfiled device data.    Electronically Signed By: BARRERA Dodd CRNA  July 17, 2025  2:30 PM

## 2025-07-17 NOTE — ANESTHESIA PREPROCEDURE EVALUATION
Anesthesia Pre-Procedure Evaluation    Patient: Enedelia Abraham   MRN: [id number] : 1955          Procedure : Procedure(s):  Colonoscopy, Screening         Past Medical History:   Diagnosis Date    Arthritis     Backache, unspecified 2000    fracture of L5    Excessive or frequent menstruation 1997    Menorrhagia 1997 and 3/1997    Pilonidal cyst without mention of abscess     Surgery to remove    Unspecified multiple gestation, delivered     , Vag      Past Surgical History:   Procedure Laterality Date    ABDOMEN SURGERY      CATARACT IOL, RT/LT Bilateral 10/8/18 and 10/28/18    Cataract IOL RT/LT    COLONOSCOPY N/A 07/15/2015    Procedure: COLONOSCOPY;  Surgeon: Frederick Flores MD;  Location: [facility]    DRAIN PILONIDAL CYST SIMPL      Pilonidal cyst surgery    ESOPHAGOSCOPY, GASTROSCOPY, DUODENOSCOPY (EGD), COMBINED  2013    Procedure: COMBINED ESOPHAGOSCOPY, GASTROSCOPY, DUODENOSCOPY (EGD), BIOPSY SINGLE OR MULTIPLE;  ESOPHAGOSCOPY, GASTROSCOPY, DUODENOSCOPY (EGD) with Multiple Biopsy's;  Surgeon: Frederick Dixon MD;  Location: [facility]    HC DILATION/CURETTAGE DIAG/THER NON OB      D & C    Z LUMBAR SPINE FUSION,ANTER APPRCH  2001    posterio of L5, S1    ZZ COLONOSCOPY W BIOPSY  2005      Allergies   Allergen Reactions    Bupropion Hcl Hives     Wellbutrin    Clindamycin Diarrhea     Several Loose stools/cramping/rectal pressure      Social History     Tobacco Use    Smoking status: Former     Current packs/day: 0.00     Average packs/day: 0.5 packs/day for 20.0 years (10.0 ttl pk-yrs)     Types: Cigarettes     Quit date: 1978     Years since quittin.5    Smokeless tobacco: Never    Tobacco comments:      X 20 years/no smokers in household   Substance Use Topics    Alcohol use: Not Currently     Comment: Very little      Wt Readings from Last 1 Encounters:   25 59 kg (130 lb)        Anesthesia Evaluation            ROS/MED HX  ENT/Pulmonary:  -  "neg pulmonary ROS     Neurologic:  - neg neurologic ROS     Cardiovascular:     (+) Dyslipidemia - -   -  - -                                      METS/Exercise Tolerance:     Hematologic:       Musculoskeletal:       GI/Hepatic:     (+)        bowel prep,            Renal/Genitourinary:  - neg Renal ROS     Endo: Comment: Prediabetic; HbA1c 5.8      Psychiatric/Substance Use:  - neg psychiatric ROS     Infectious Disease:  - neg infectious disease ROS     Malignancy:       Other:              Physical Exam  Airway  Mallampati: II  TM distance: >3 FB  Neck ROM: full    Cardiovascular - normal exam   Dental     Pulmonary - normal exam      Neurological - normal exam  She appears awake, alert and oriented x3.    Other Findings       OUTSIDE LABS:  CBC:   Lab Results   Component Value Date    WBC 7.1 12/16/2014    WBC 8.9 09/09/2010    HGB 13.7 12/16/2014    HGB 13.5 09/09/2010    HCT 39.9 12/16/2014    HCT 40.0 09/09/2010     12/16/2014     09/09/2010     BMP:   Lab Results   Component Value Date     12/16/2014     07/30/2008    POTASSIUM 3.8 12/16/2014    POTASSIUM 4.5 07/30/2008    CHLORIDE 107 12/16/2014    CHLORIDE 108 07/30/2008    CO2 27 12/16/2014    CO2 25 07/30/2008    BUN 21 12/16/2014    BUN 22 07/30/2008    CR 0.72 12/16/2014    CR 0.78 07/30/2008     (H) 05/23/2024     (H) 03/24/2023     COAGS: No results found for: \"PTT\", \"INR\", \"FIBR\"  POC: No results found for: \"BGM\", \"HCG\", \"HCGS\"  HEPATIC: No results found for: \"ALBUMIN\", \"PROTTOTAL\", \"ALT\", \"AST\", \"GGT\", \"ALKPHOS\", \"BILITOTAL\", \"BILIDIRECT\", \"MELO\"  OTHER:   Lab Results   Component Value Date    PH 6.5 09/21/2001    A1C 5.8 (H) 06/05/2025    ANDRÉS 9.4 12/16/2014    TSH 1.61 06/05/2025       Anesthesia Plan    ASA Status:  2      NPO Status: NPO Appropriate   Anesthesia Type: MAC.  Airway: natural airway.  Induction: intravenous.  Maintenance: TIVA.   Techniques and Equipment:       - Monitoring Plan: standard " ASA monitoring     Consents    Anesthesia Plan(s) and associated risks, benefits, and realistic alternatives discussed. Questions answered and patient/representative(s) expressed understanding.     - Discussed: CRNA     - Discussed with:  Patient        - Pt is DNR/DNI Status: no DNR          Postoperative Care         Comments:                   BARRERA Hector CRNA    I have reviewed the pertinent notes and labs in the chart from the past 30 days and (re)examined the patient.  Any updates or changes from those notes are reflected in this note.    Clinically Significant Risk Factors Present on Admission

## 2025-07-17 NOTE — DISCHARGE INSTRUCTIONS
[facility]    Home Care Following Endoscopy          Activity:  You have just undergone an endoscopic procedure usually performed with conscious sedation.  Do not work or operate machinery (including a car) for at least 12 hours.    I encourage you to walk and attempt to pass this air as soon as possible.    Diet:  Return to the diet you were on before your procedure but eat lightly for the first 12-24 hours.  Drink plenty of water.  Resume any regular medications unless otherwise advised by your physician.  Please begin any new medication prescribed as a result of your procedure as directed by your physician.   If you had any biopsy or polyp removed please refrain from aspirin or aspirin products for 2 days.  If on Coumadin please restart as instructed by your physician.   Pain:  You may take Tylenol as needed for pain.  Expected Recovery:  You can expect some mild abdominal fullness and/or discomfort due to the air used to inflate your intestinal tract. It is also normal to have a mild sore throat after upper endoscopy.    Call Your Physician if You Have:  After Colonoscopy:  Worsening persisting abdominal pain which is worse with activity.  Fevers (>101 degrees F), chills or shakes.  Passage of continued blood with bowel movements.     Any questions or concerns about your recovery, please call [phone] or after hours [phone] ([phone]) Nurse Advice Line.    Follow-up Care:  You did have polyps/biopsy tissue sample(s) removed.  The polyps/biopsy tissue sample(s) will be sent to pathology.    You should receive letter in your My Chart from  with your results within 1-2 weeks. If you do not participate in My Chart a physical letter will come in the mail in 2-3 weeks.  Please call if you have not received a notification of your results.  If asked to return to clinic please make an appointment 1 week after your procedure.  Call [phone].

## 2025-07-21 LAB
PATH REPORT.COMMENTS IMP SPEC: NORMAL
PATH REPORT.FINAL DX SPEC: NORMAL
PATH REPORT.GROSS SPEC: NORMAL
PATH REPORT.MICROSCOPIC SPEC OTHER STN: NORMAL
PATH REPORT.RELEVANT HX SPEC: NORMAL
PHOTO IMAGE: NORMAL

## 2025-07-21 PROCEDURE — 88305 TISSUE EXAM BY PATHOLOGIST: CPT | Mod: 26 | Performed by: PATHOLOGY

## 2025-08-07 PROBLEM — D12.6 ADENOMATOUS POLYP OF COLON: Status: ACTIVE | Noted: 2025-08-07

## (undated) DEVICE — SOLUTION WATER 1000ML BOTTLE R5000-01

## (undated) DEVICE — ENDO SNARE EXACTO COLD 9MM LOOP 2.4MMX230CM 00711115

## (undated) DEVICE — TUBING SUCTION 6"X3/16" N56A

## (undated) DEVICE — ENDO TRAP POLYP E-TRAP 00711099

## (undated) DEVICE — KIT ENDO TURNOVER/PROCEDURE CARRY-ON 101822